# Patient Record
Sex: MALE | Employment: FULL TIME | URBAN - METROPOLITAN AREA
[De-identification: names, ages, dates, MRNs, and addresses within clinical notes are randomized per-mention and may not be internally consistent; named-entity substitution may affect disease eponyms.]

---

## 2021-07-02 ENCOUNTER — INITIAL CONSULT (OUTPATIENT)
Dept: SURGERY | Age: 56
End: 2021-07-02
Payer: COMMERCIAL

## 2021-07-02 ENCOUNTER — TELEPHONE (OUTPATIENT)
Dept: SURGERY | Age: 56
End: 2021-07-02

## 2021-07-02 VITALS
HEART RATE: 62 BPM | TEMPERATURE: 97.9 F | HEIGHT: 69 IN | DIASTOLIC BLOOD PRESSURE: 74 MMHG | SYSTOLIC BLOOD PRESSURE: 127 MMHG | WEIGHT: 175 LBS | BODY MASS INDEX: 25.92 KG/M2

## 2021-07-02 DIAGNOSIS — K21.9 GASTROESOPHAGEAL REFLUX DISEASE, UNSPECIFIED WHETHER ESOPHAGITIS PRESENT: Primary | ICD-10-CM

## 2021-07-02 PROCEDURE — 99244 OFF/OP CNSLTJ NEW/EST MOD 40: CPT | Performed by: SURGERY

## 2021-07-02 RX ORDER — LANSOPRAZOLE 30 MG/1
30 CAPSULE, DELAYED RELEASE ORAL DAILY
Qty: 90 CAPSULE | Refills: 0 | Status: SHIPPED | OUTPATIENT
Start: 2021-07-02

## 2021-07-02 RX ORDER — SODIUM CHLORIDE, SODIUM LACTATE, POTASSIUM CHLORIDE, CALCIUM CHLORIDE 600; 310; 30; 20 MG/100ML; MG/100ML; MG/100ML; MG/100ML
INJECTION, SOLUTION INTRAVENOUS CONTINUOUS
Status: CANCELLED | OUTPATIENT
Start: 2021-07-02

## 2021-07-02 RX ORDER — LANSOPRAZOLE 30 MG/1
30 CAPSULE, DELAYED RELEASE ORAL DAILY
COMMUNITY
End: 2021-07-02 | Stop reason: SDUPTHER

## 2021-07-02 NOTE — TELEPHONE ENCOUNTER
Prior Authorization Form:      DEMOGRAPHICS:                     Patient Name:  Yaritza Martinez  Patient :  1965            Insurance:  Payor: Olman Luigi [employer] 150 / Plan: AlinaDebbie Oreilly 150 - [place] PPO / Product Type: *No Product type* /   Insurance ID Number:    Payor/Plan Subscr  Sex Relation Sub. Ins. ID Effective Group Num   1.  [address] [facility]  1965 Male Self [id number]                                    [id number]         DIAGNOSIS & PROCEDURE:                       Procedure/Operation: EGD possible biopsy           CPT Code: 33362    Diagnosis:  GERD     ICD10 Code: K21.9    Location:  [facility]    Surgeon:  Cedric Payne INFORMATION:                          Date: 21    Time: TBD              Anesthesia:  MAC/TIVA                                                       Status:  Outpatient        Special Comments:         Electronically signed by Sunita Reyez RN on 2021 at 12:53 PM

## 2021-07-02 NOTE — PROGRESS NOTES
Eunice Gonzalez  7/2/2021      [facility] Office Consult    CHIEF COMPLAINT:  Acid reflux    HISTORY OF PRESENT ILLNESS:  Eunice Gonzalez is a 61 y.o.  male with acid reflux up into the throat, causes a sore throat and hoarceness. He has been on Prevacid for 20 years and was told he had a hiatal hernia. Past Medical History: He has no past medical history on file. Past Surgical History: He has no past surgical history on file. Home Medications  Prior to Visit Medications    Medication Sig Taking? Authorizing Provider   lansoprazole (PREVACID) 30 MG delayed release capsule Take 30 mg by mouth daily Yes Historical Provider, MD       Allergies: Penicillins   Social History:   TOBACCO:   reports that he has never smoked. He has never used smokeless tobacco.  All smokers should join the free smoking cessation program and stop smoking before having surgery. ETOH:    reports current alcohol use. History reviewed. No pertinent family history. Review of Systems:  Psychiatric: denies depression and anxiety  Respiratory: negative  Cardiovascular: negative  Gastrointestinal: negative  Musculoskeletal:negative  All others reviewed, negative    Physical Exam:   VITALS: Blood pressure 127/74, pulse 62, temperature 97.9 °F (36.6 °C), temperature source Temporal, height 5' 9\" (1.753 m), weight 175 lb (79.4 kg).   General appearance: alert, appears stated age and cooperative, does ambulate easily  Head: Normocephalic, without obvious abnormality, atraumatic  Eyes: PERRL  Ears/mouth/throat:  Ears clear, mouth normal, throat no redness  Neck: no adenopathy, no JVD, supple, symmetrical, trachea midline and thyroid not enlarged  Lungs: clear to auscultation bilaterally  Heart: regular rate and rhythm  Abdomen: soft, non-tender; bowel sounds normal; no masses,  no organomegaly  Extremities: extremities normal, atraumatic, no cyanosis or edema  Skin: no open wounds    ASSESSMENT: acid reflux    PLAN: EGD    Signed: Dr. Bishop Mejía Jaime Alberto M.D. Send copy of consult to PCP, No primary care provider on file.

## 2021-07-02 NOTE — TELEPHONE ENCOUNTER
Per Dr. Jonathan Curtis, patient is scheduled for EGD possible biopsy at [address] [place] [address] on 7/21/21. Surgery scheduling form faxed with confirmation, surgeon's calendar updated. Dr. Jonathan Curtis to enter orders. Follow up appointment scheduled.    Electronically signed by Lin Edouard RN on 7/2/2021 at 12:53 PM

## 2021-07-06 ENCOUNTER — TELEPHONE (OUTPATIENT)
Dept: SURGERY | Age: 56
End: 2021-07-06

## 2021-07-06 NOTE — TELEPHONE ENCOUNTER
Received a PA request for Lansoprazole 30 mg from [facility]. The drug is not not  his formulary, If he wishes he can pay 20.00 for the script or new script need to be called in. Called and left message for patient to call us back.

## 2021-07-15 ENCOUNTER — HOSPITAL ENCOUNTER (OUTPATIENT)
Age: 56
Discharge: HOME | End: 2021-07-17
Payer: COMMERCIAL

## 2021-07-15 DIAGNOSIS — Z01.818 PREOP TESTING: ICD-10-CM

## 2021-07-15 PROCEDURE — U0003 INFECTIOUS AGENT DETECTION BY NUCLEIC ACID (DNA OR RNA); SEVERE ACUTE RESPIRATORY SYNDROME CORONAVIRUS 2 (SARS-COV-2) (CORONAVIRUS DISEASE [COVID-19]), AMPLIFIED PROBE TECHNIQUE, MAKING USE OF HIGH THROUGHPUT TECHNOLOGIES AS DESCRIBED BY CMS-2020-01-R: HCPCS

## 2021-07-15 PROCEDURE — U0005 INFEC AGEN DETEC AMPLI PROBE: HCPCS

## 2021-07-16 LAB — SARS-COV-2, PCR: NOT DETECTED

## 2021-07-20 RX ORDER — CALCIUM CARBONATE 200(500)MG
1 TABLET,CHEWABLE ORAL PRN
COMMUNITY

## 2021-07-20 NOTE — PROGRESS NOTES
[address] [facility]                                                                                                                    PRE OP INSTRUCTIONS FOR  Soren Perry        Date: 7/20/2021    Date of surgery: 7/21/21   Arrival Time: Hospital will call you between 5pm and 7pm with your final arrival time for surgery    1. Do not eat or drink anything after midnight prior to surgery. This includes no water, chewing gum, mints or ice chips. 2. Take the following medications with a small sip of water on the morning of Surgery: none     3. Diabetics may take evening dose of insulin but none after midnight. If you feel symptomatic or low blood sugar morning of surgery drink 1-2 ounces of apple juice only. 4. Aspirin, Ibuprofen, Advil, Naproxen, Vitamin E and other Anti-inflammatory products should be stopped  before surgery  as directed by your physician. Take Tylenol only unless instructed otherwise by your surgeon. 5. Check with your Doctor regarding stopping Plavix, Coumadin, Lovenox, Eliquis, Effient, or other blood thinners. 6. Do not smoke,use illicit drugs and do not drink any alcoholic beverages 24 hours prior to surgery. 7. You may brush your teeth the morning of surgery. DO NOT SWALLOW WATER    8. You MUST make arrangements for a responsible adult to take you home after your surgery. You will not be allowed to leave alone or drive yourself home. It is strongly suggested someone stay with you the first 24 hrs. Your surgery will be cancelled if you do not have a ride home. 9. PEDIATRIC PATIENTS ONLY:  A parent/legal guardian must accompany a child scheduled for surgery and plan to stay at the hospital until the child is discharged. Please do not bring other children with you.     10. Please wear simple, loose fitting clothing to the hospital.  Devota Plants not bring valuables (money, credit cards, checkbooks, etc.) Do not wear any makeup (including no eye makeup) or nail polish on your fingers or toes. 11. DO NOT wear any jewelry or piercings on day of surgery. All body piercing jewelry must be removed. 12. Shower the night before surgery with _x__Antibacterial soap /DERRICK WIPES________    13. TOTAL JOINT REPLACEMENT/HYSTERECTOMY PATIENTS ONLY---Remember to bring Blood Bank bracelet to the hospital on the day of surgery. 14. If you have a Living Will and Durable Power of  for Healthcare, please bring in a copy. 15. If appropriate bring crutches, inspirex, WALKER, CANE etc... 12. Notify your Surgeon if you develop any illness between now and surgery time, cough, cold, fever, sore throat, nausea, vomiting, etc.  Please notify your surgeon if you experience dizziness, shortness of breath or blurred vision between now & the time of your surgery. 17. If you have ___dentures, they will be removed before going to the OR; we will provide you a container. If you wear ___contact lenses or ___glasses, they will be removed; please bring a case for them. 18. To provide excellent care visitors will be limited to 1 in the room at any given time. 19. Please bring picture ID and insurance card. 20. Sleep apnea patients need to bring CPAP AND SETTINGS to hospital on day of surgery. 21. During flu season no children under the age of 15 are permitted in the hospital for the safety of all patients. 22. Other please check in at information desk/wear a mask. Please call AMBULATORY CARE if you have any further questions.    [address] [facility]     [address] [place]

## 2021-07-21 ENCOUNTER — ANESTHESIA (OUTPATIENT)
Dept: ENDOSCOPY | Age: 56
End: 2021-07-21
Payer: COMMERCIAL

## 2021-07-21 ENCOUNTER — ANESTHESIA EVENT (OUTPATIENT)
Dept: ENDOSCOPY | Age: 56
End: 2021-07-21
Payer: COMMERCIAL

## 2021-07-21 ENCOUNTER — HOSPITAL ENCOUNTER (OUTPATIENT)
Age: 56
Setting detail: OUTPATIENT SURGERY
Discharge: HOME | End: 2021-07-21
Attending: SURGERY | Admitting: SURGERY
Payer: COMMERCIAL

## 2021-07-21 VITALS
SYSTOLIC BLOOD PRESSURE: 150 MMHG | OXYGEN SATURATION: 99 % | TEMPERATURE: 97.5 F | BODY MASS INDEX: 24.24 KG/M2 | HEART RATE: 62 BPM | HEIGHT: 69 IN | DIASTOLIC BLOOD PRESSURE: 78 MMHG | WEIGHT: 163.7 LBS | RESPIRATION RATE: 16 BRPM

## 2021-07-21 VITALS
OXYGEN SATURATION: 99 % | DIASTOLIC BLOOD PRESSURE: 82 MMHG | SYSTOLIC BLOOD PRESSURE: 125 MMHG | RESPIRATION RATE: 18 BRPM

## 2021-07-21 DIAGNOSIS — Z01.818 PREOP TESTING: Primary | ICD-10-CM

## 2021-07-21 PROBLEM — K21.00 GASTROESOPHAGEAL REFLUX DISEASE WITH ESOPHAGITIS WITHOUT HEMORRHAGE: Status: ACTIVE | Noted: 2021-07-21

## 2021-07-21 PROCEDURE — 7100000011 HC PHASE II RECOVERY - ADDTL 15 MIN: Performed by: SURGERY

## 2021-07-21 PROCEDURE — 7100000010 HC PHASE II RECOVERY - FIRST 15 MIN: Performed by: SURGERY

## 2021-07-21 PROCEDURE — 3609017100 HC EGD: Performed by: SURGERY

## 2021-07-21 PROCEDURE — 88305 TISSUE EXAM BY PATHOLOGIST: CPT

## 2021-07-21 PROCEDURE — 43239 EGD BIOPSY SINGLE/MULTIPLE: CPT | Performed by: SURGERY

## 2021-07-21 PROCEDURE — 87081 CULTURE SCREEN ONLY: CPT

## 2021-07-21 PROCEDURE — 2500000003 HC RX 250 WO HCPCS

## 2021-07-21 PROCEDURE — 6360000002 HC RX W HCPCS

## 2021-07-21 PROCEDURE — 2580000003 HC RX 258: Performed by: SURGERY

## 2021-07-21 PROCEDURE — 3700000000 HC ANESTHESIA ATTENDED CARE: Performed by: SURGERY

## 2021-07-21 PROCEDURE — 2709999900 HC NON-CHARGEABLE SUPPLY: Performed by: SURGERY

## 2021-07-21 RX ORDER — SODIUM CHLORIDE, SODIUM LACTATE, POTASSIUM CHLORIDE, CALCIUM CHLORIDE 600; 310; 30; 20 MG/100ML; MG/100ML; MG/100ML; MG/100ML
INJECTION, SOLUTION INTRAVENOUS CONTINUOUS
Status: DISCONTINUED | OUTPATIENT
Start: 2021-07-21 | End: 2021-07-21 | Stop reason: HOSPADM

## 2021-07-21 RX ORDER — LIDOCAINE HYDROCHLORIDE 20 MG/ML
INJECTION, SOLUTION INFILTRATION; PERINEURAL PRN
Status: DISCONTINUED | OUTPATIENT
Start: 2021-07-21 | End: 2021-07-21 | Stop reason: SDUPTHER

## 2021-07-21 RX ORDER — PROPOFOL 10 MG/ML
INJECTION, EMULSION INTRAVENOUS PRN
Status: DISCONTINUED | OUTPATIENT
Start: 2021-07-21 | End: 2021-07-21 | Stop reason: SDUPTHER

## 2021-07-21 RX ADMIN — SODIUM CHLORIDE, POTASSIUM CHLORIDE, SODIUM LACTATE AND CALCIUM CHLORIDE: 600; 310; 30; 20 INJECTION, SOLUTION INTRAVENOUS at 12:43

## 2021-07-21 RX ADMIN — PROPOFOL 120 MG: 10 INJECTION, EMULSION INTRAVENOUS at 13:27

## 2021-07-21 RX ADMIN — LIDOCAINE HYDROCHLORIDE 50 MG: 20 INJECTION, SOLUTION INFILTRATION; PERINEURAL at 13:27

## 2021-07-21 ASSESSMENT — PAIN - FUNCTIONAL ASSESSMENT: PAIN_FUNCTIONAL_ASSESSMENT: 0-10

## 2021-07-21 ASSESSMENT — PAIN SCALES - GENERAL: PAINLEVEL_OUTOF10: 0

## 2021-07-21 NOTE — PROGRESS NOTES
7/21/21 1230 dr lloyd aware pt had 4 ounces of water this am at 0830.  Per dr Rivers Borders \"this is fine\" kmo rn

## 2021-07-21 NOTE — ANESTHESIA PRE PROCEDURE
Department of Anesthesiology  Preprocedure Note       Name:  Lashonda Mejia   Age:  61 y.o.  :  1965                                          MRN:  [id number]         Date:  2021      Surgeon: Alisha Dolan):  Kandace Rios MD    Procedure: Procedure(s):  EGD ESOPHAGOGASTRODUODENOSCOPY POSSIBLE BIOPSY    Medications prior to admission:   Prior to Admission medications    Medication Sig Start Date End Date Taking? Authorizing Provider   calcium carbonate (TUMS) 500 MG chewable tablet Take 1 tablet by mouth as needed for Heartburn   Yes Historical Provider, MD   lansoprazole (PREVACID) 30 MG delayed release capsule Take 1 capsule by mouth daily 21  Yes Kandace Rios MD       Current medications:    Current Facility-Administered Medications   Medication Dose Route Frequency Provider Last Rate Last Admin    lactated ringers infusion   Intravenous Continuous Kandace Rios  mL/hr at 21 1243 New Bag at 21 1243       Allergies: Allergies   Allergen Reactions    Penicillins Rash       Problem List:  There is no problem list on file for this patient. Past Medical History:  History reviewed. No pertinent past medical history. Past Surgical History:        Procedure Laterality Date    COLONOSCOPY      ENDOSCOPY, COLON, DIAGNOSTIC      EYE SURGERY      as a child       Social History:    Social History     Tobacco Use    Smoking status: Never Smoker    Smokeless tobacco: Never Used   Substance Use Topics    Alcohol use:  Yes     Alcohol/week: 1.0 standard drinks     Types: 1 Cans of beer per week     Comment: weekly                                Counseling given: Not Answered      Vital Signs (Current):   Vitals:    21 1209   BP: 121/71   Pulse: 71   Temp: 97.7 °F (36.5 °C)   TempSrc: Infrared   SpO2: 98%   Weight: 163 lb 11.2 oz (74.3 kg)   Height: 5' 9\" (1.753 m)                                              BP Readings from Last 3 Encounters:   21 121/71 patient. Plan discussed with CRNA.                   Adrien Barros MD   7/21/2021

## 2021-07-21 NOTE — OP NOTE
[facility]    Operative Report    DATE OF PROCEDURE: 7/21/2021    SURGEON: MD CLAUDETTE Gr: none    PREOPERATIVE DIAGNOSES:    Gastroesophageal reflux disease K21.00     POSTOPERATIVE DIAGNOSES:     EGD done 7/21/2021 showed no gastritis, no duodenitis, mild linear esophagitis that may be Rudd's, no hiatal hernia. He complains of acid reflux and had been on Omeprazole to control the symptoms for many years. OPERATION:    EGD    ANESTHESIA: LMAC. BLOOD LOSS: none  SPECIMEN: esophagus erosion, gastric mucosa for RENNY    CONSENT AND INDICATIONS:  This is a 61y.o. year old male who needs to have an EGD as part of the work up. I have discussed with the patient the indication, alternatives, and the possible risks and/or complications of the planned procedure and the anesthesia methods. The patient and/or patient representative appear to understand and agree to proceed. Monitoring and Safety: Anaesthesia monitored vital signs with BP cuff, pulse oximetry and cardiac monitor and level of consciousness were continuously evaluated throughout the procedure until recovery from the medications was complete and the patient had returned to baseline status. OPERATIONS: The patient was placed on the table and sedated by anaesthesia. Bite block was placed. A lubricated scope was easily passed into the upper esophagus which looked normal. The distal esophagus had a 15 mm linear erosion that had the appearance of Rudd's, biopsy was taken. The scope was passed into the stomach and retroflexed. There was no hiatal hernia. The scope was passed down toward the pylorus. The antral mucosa was normal. Biopsy was taken to check for H. pylori. The scope was passed through the pylorus into the duodenal bulb which was normal, then around to the distal duodenum which looked normal, and the scope was withdrawn.  The patient tolerated the procedure well.    Plan:   Fundoplication may improve the reflux and decrease the chance of developing Rudd's esophagus.     Physician Signature: Electronically signed by Dr. Praveena Marshall M.D.

## 2021-07-21 NOTE — ANESTHESIA POSTPROCEDURE EVALUATION
Department of Anesthesiology  Postprocedure Note    Patient: Mane Jackman  MRN: [id number]  YOB: 1965  Date of evaluation: 7/21/2021  Time:  2:14 PM     Procedure Summary     Date: 07/21/21 Room / Location: [address] [place] [address] / [facility]    Anesthesia Start: 5242 Anesthesia Stop: 3752    Procedure: EGD ESOPHAGOGASTRODUODENOSCOPY POSSIBLE BIOPSY (N/A ) Diagnosis: (GERD)    Surgeons: Marcianne Favre, MD Responsible Provider: Munira Kimble MD    Anesthesia Type: MAC ASA Status: 2          Anesthesia Type: MAC    Aubrie Phase I: Aubrie Score: 10    Aubrie Phase II: Aubrie Score: 10    Last vitals: Reviewed and per EMR flowsheets.        Anesthesia Post Evaluation    Patient location during evaluation: PACU  Patient participation: complete - patient participated  Level of consciousness: awake  Pain score: 3  Airway patency: patent  Nausea & Vomiting: no nausea  Complications: no  Cardiovascular status: blood pressure returned to baseline  Respiratory status: acceptable  Hydration status: euvolemic

## 2021-07-21 NOTE — H&P
Soren Perry  7/21/2021      H&P    CHIEF COMPLAINT:  Acid reflux    HISTORY OF PRESENT ILLNESS:  Soren Perry is a 61 y.o.  male with acid reflux up into the throat, causes a sore throat and hoarceness. He has been on Prevacid for 20 years and was told he had a hiatal hernia. Past Medical History: He has no past medical history on file. Past Surgical History: He has a past surgical history that includes Endoscopy, colon, diagnostic; Colonoscopy; and eye surgery. Home Medications  Prior to Visit Medications    Medication Sig Taking? Authorizing Provider   calcium carbonate (TUMS) 500 MG chewable tablet Take 1 tablet by mouth as needed for Heartburn Yes Historical Provider, MD   lansoprazole (PREVACID) 30 MG delayed release capsule Take 1 capsule by mouth daily Yes Ossie Rinne, MD       Allergies: Penicillins   Social History:   TOBACCO:   reports that he has never smoked. He has never used smokeless tobacco.  All smokers should join the free smoking cessation program and stop smoking before having surgery. ETOH:    reports current alcohol use of about 1.0 standard drinks of alcohol per week. History reviewed. No pertinent family history. Review of Systems:  Psychiatric: denies depression and anxiety  Respiratory: negative  Cardiovascular: negative  Gastrointestinal: negative  Musculoskeletal:negative  All others reviewed, negative    Physical Exam:   VITALS: Blood pressure 121/71, pulse 71, temperature 97.7 °F (36.5 °C), temperature source Infrared, height 5' 9\" (1.753 m), weight 163 lb 11.2 oz (74.3 kg), SpO2 98 %.   General appearance: alert, appears stated age and cooperative, does ambulate easily  Head: Normocephalic, without obvious abnormality, atraumatic  Eyes: PERRL  Ears/mouth/throat:  Ears clear, mouth normal, throat no redness  Neck: no adenopathy, no JVD, supple, symmetrical, trachea midline and thyroid not enlarged  Lungs: clear to auscultation bilaterally  Heart: regular rate and rhythm  Abdomen: soft, non-tender; bowel sounds normal; no masses,  no organomegaly  Extremities: extremities normal, atraumatic, no cyanosis or edema  Skin: no open wounds    ASSESSMENT: acid reflux    PLAN: EGD    Signed: Dr. Praveena Marshall M.D.     Send copy of consult to PCP, Shoshana Chung MD

## 2021-07-23 LAB — CLOTEST: NORMAL

## 2021-07-30 ENCOUNTER — VIRTUAL VISIT (OUTPATIENT)
Dept: SURGERY | Age: 56
End: 2021-07-30
Payer: COMMERCIAL

## 2021-07-30 ENCOUNTER — TELEPHONE (OUTPATIENT)
Dept: SURGERY | Age: 56
End: 2021-07-30

## 2021-07-30 DIAGNOSIS — K21.00 GASTROESOPHAGEAL REFLUX DISEASE WITH ESOPHAGITIS WITHOUT HEMORRHAGE: Primary | ICD-10-CM

## 2021-07-30 PROCEDURE — 99442 PR PHYS/QHP TELEPHONE EVALUATION 11-20 MIN: CPT | Performed by: SURGERY

## 2021-07-30 RX ORDER — ACETAMINOPHEN 325 MG/1
1000 TABLET ORAL ONCE
Status: CANCELLED | OUTPATIENT
Start: 2021-07-30 | End: 2021-07-30

## 2021-07-30 RX ORDER — SODIUM CHLORIDE 9 MG/ML
INJECTION, SOLUTION INTRAVENOUS CONTINUOUS
Status: CANCELLED | OUTPATIENT
Start: 2021-07-30

## 2021-07-30 RX ORDER — KETOROLAC TROMETHAMINE 30 MG/ML
30 INJECTION, SOLUTION INTRAMUSCULAR; INTRAVENOUS ONCE
Status: CANCELLED | OUTPATIENT
Start: 2021-07-30 | End: 2021-07-30

## 2021-07-30 RX ORDER — SCOLOPAMINE TRANSDERMAL SYSTEM 1 MG/1
1 PATCH, EXTENDED RELEASE TRANSDERMAL
Status: CANCELLED | OUTPATIENT
Start: 2021-07-30 | End: 2021-08-02

## 2021-07-30 NOTE — PROGRESS NOTES
Yessenia Garcia  7/30/2021      [facility] Office phone visit    Consent:  The patient is aware that he may receive a bill for this service, depending on insurance coverage, and has provided verbal consent to proceed: Yes    Pt at home, I'm in the office, no one helped. I affirm this is a Patient Initiated Episode with an Established Patient who has not had a related appointment within my department in the past 7 days or scheduled within the next 24 hours. Patient identification was verified at the start of the visit: Yes    Total Time: minutes: 15 minutes          Yessenia Garcia is a 61 y.o.  male post EGD done 7/21/2021 showed no gastritis, RENNY test negative for H.pylori, no duodenitis, positive linear esophagitis, negative for Rudd's, intestinal metaplasia and dysplasia, was told he had a hiatal hernia. He complains of acid reflux and had been on Omeprazole to control the symptoms for 20 years. Past Medical History: He has no past medical history on file. Past Surgical History: He has a past surgical history that includes Endoscopy, colon, diagnostic; Colonoscopy; eye surgery; and Upper gastrointestinal endoscopy (N/A, 7/21/2021). Home Medications  Prior to Visit Medications    Medication Sig Taking? Authorizing Provider   calcium carbonate (TUMS) 500 MG chewable tablet Take 1 tablet by mouth as needed for Heartburn Yes Historical Provider, MD   lansoprazole (PREVACID) 30 MG delayed release capsule Take 1 capsule by mouth daily Yes Phuong Mosquera MD       Allergies: Latex and Penicillins   Social History:   TOBACCO:   reports that he has never smoked. He has never used smokeless tobacco.  All smokers should join the free smoking cessation program and stop smoking before having surgery. ETOH:    reports current alcohol use of about 1.0 standard drinks of alcohol per week. No family history on file.     Review of Systems:  Psychiatric: denies depression and anxiety  Respiratory: negative  Cardiovascular: negative  Gastrointestinal: negative  Musculoskeletal:negative  All others reviewed, negative    Physical Exam:   VITALS: There were no vitals taken for this visit. General appearance: alert, appears stated age and cooperative, does ambulate easily  Head: Normocephalic, without obvious abnormality, atraumatic  Eyes: PERRL  Ears/mouth/throat:  Ears clear, mouth normal, throat no redness  Neck: no adenopathy, no JVD, supple, symmetrical, trachea midline and thyroid not enlarged  Lungs: clear to auscultation bilaterally  Heart: regular rate and rhythm  Abdomen: soft, non-tender; bowel sounds normal; no masses,  no organomegaly  Extremities: extremities normal, atraumatic, no cyanosis or edema  Skin: no open wounds    ASSESSMENT: acid reflux    PLAN:   Robotic hiatal hernia repair/fundoplication may improve the reflux and decrease the chance of developing Rudd's esophagus. Signed: Dr. Fariha Vee M.D.     Send copy of consult to PCP, Clement Jasso MD

## 2021-08-07 ENCOUNTER — HOSPITAL ENCOUNTER (EMERGENCY)
Age: 56
Discharge: HOME | End: 2021-08-07
Attending: EMERGENCY MEDICINE
Payer: COMMERCIAL

## 2021-08-07 VITALS
RESPIRATION RATE: 16 BRPM | HEART RATE: 83 BPM | OXYGEN SATURATION: 96 % | TEMPERATURE: 97.5 F | DIASTOLIC BLOOD PRESSURE: 81 MMHG | SYSTOLIC BLOOD PRESSURE: 129 MMHG

## 2021-08-07 DIAGNOSIS — K08.89 PAIN, DENTAL: Primary | ICD-10-CM

## 2021-08-07 PROCEDURE — 6370000000 HC RX 637 (ALT 250 FOR IP): Performed by: EMERGENCY MEDICINE

## 2021-08-07 PROCEDURE — 99283 EMERGENCY DEPT VISIT LOW MDM: CPT

## 2021-08-07 RX ORDER — CLINDAMYCIN HYDROCHLORIDE 150 MG/1
450 CAPSULE ORAL 3 TIMES DAILY
Qty: 90 CAPSULE | Refills: 0 | Status: SHIPPED | OUTPATIENT
Start: 2021-08-07 | End: 2021-08-17

## 2021-08-07 RX ORDER — CLINDAMYCIN HYDROCHLORIDE 150 MG/1
450 CAPSULE ORAL ONCE
Status: COMPLETED | OUTPATIENT
Start: 2021-08-07 | End: 2021-08-07

## 2021-08-07 RX ORDER — IBUPROFEN 800 MG/1
800 TABLET ORAL EVERY 8 HOURS PRN
Qty: 21 TABLET | Refills: 0 | Status: SHIPPED | OUTPATIENT
Start: 2021-08-07 | End: 2021-08-14

## 2021-08-07 RX ADMIN — CLINDAMYCIN HYDROCHLORIDE 450 MG: 150 CAPSULE ORAL at 18:47

## 2021-08-07 NOTE — ED PROVIDER NOTES
Department of Emergency Medicine   ED  Provider Note  Admit Date/RoomTime: 8/7/2021  5:47 PM  ED Room: 12/12 8/7/21  6:42 PM EDT      HPI: Brittanie Gonsalez 61 y.o. male with a past medical history of No past medical history on file. presents with a complaint of dental pain. The patient states this pain has been gradual in onset, intermittent, moderate in severity. Currently resolved. Reports he is concerned he is getting an abscess and was worried about spread of infection to presented for antibiotics. patient denies any unilateral facial swelling. Patient is able to handle their own secretions and drink fluids without difficulty. Patient denies any fever. The patient also denies any history of dental trauma. Denies difficulty breathing or swallowing. The location of the pain appears to be isolated over tooth number 22. Review of Systems:   Pertinent positives and negatives are stated within HPI, all other systems reviewed and are negative.           --------------------------------------------- PAST HISTORY ---------------------------------------------  Past Medical History:  has no past medical history on file. Past Surgical History:  has a past surgical history that includes Endoscopy, colon, diagnostic; Colonoscopy; eye surgery; and Upper gastrointestinal endoscopy (N/A, 7/21/2021). Social History:  reports that he has never smoked. He has never used smokeless tobacco. He reports current alcohol use of about 1.0 standard drinks of alcohol per week. He reports that he does not use drugs. Family History: family history is not on file. The patients home medications have been reviewed.     Allergies: Latex and Penicillins        Nursing Notes reviewed and vital signs reviewed by myself  /81   Pulse 83   Temp 97.5 °F (36.4 °C) (Infrared)   Resp 16   SpO2 96%     Physical exam:  Constitutional: The patient is comfortable, alert and oriented x3, well appearing, non toxic in

## 2021-10-21 ENCOUNTER — TELEPHONE (OUTPATIENT)
Dept: SURGERY | Age: 56
End: 2021-10-21

## 2021-10-29 ENCOUNTER — TELEPHONE (OUTPATIENT)
Dept: SURGERY | Age: 56
End: 2021-10-29

## 2021-10-29 NOTE — TELEPHONE ENCOUNTER
Pt called stating that he had scope done and it did show that he does not have a hiatal hernia. He states that he does not need to follow up with General Surgery.

## 2023-03-29 LAB
ALANINE AMINOTRANSFERASE (SGPT) (U/L) IN SER/PLAS: 29 U/L (ref 10–52)
ALBUMIN (G/DL) IN SER/PLAS: 4.3 G/DL (ref 3.4–5)
ALKALINE PHOSPHATASE (U/L) IN SER/PLAS: 55 U/L (ref 33–120)
ANION GAP IN SER/PLAS: 12 MMOL/L (ref 10–20)
ASPARTATE AMINOTRANSFERASE (SGOT) (U/L) IN SER/PLAS: 24 U/L (ref 9–39)
BILIRUBIN TOTAL (MG/DL) IN SER/PLAS: 1.5 MG/DL (ref 0–1.2)
CALCIUM (MG/DL) IN SER/PLAS: 9.1 MG/DL (ref 8.6–10.3)
CARBON DIOXIDE, TOTAL (MMOL/L) IN SER/PLAS: 26 MMOL/L (ref 21–32)
CHLORIDE (MMOL/L) IN SER/PLAS: 104 MMOL/L (ref 98–107)
CHOLESTEROL (MG/DL) IN SER/PLAS: 154 MG/DL (ref 0–199)
CHOLESTEROL IN HDL (MG/DL) IN SER/PLAS: 61.8 MG/DL
CHOLESTEROL/HDL RATIO: 2.5
CREATININE (MG/DL) IN SER/PLAS: 0.96 MG/DL (ref 0.5–1.3)
ESTIMATED AVERAGE GLUCOSE FOR HBA1C: 120 MG/DL
FERRITIN (UG/LL) IN SER/PLAS: 440 UG/L (ref 20–300)
GFR MALE: >90 ML/MIN/1.73M2
GLUCOSE (MG/DL) IN SER/PLAS: 102 MG/DL (ref 74–99)
HEMOGLOBIN A1C/HEMOGLOBIN TOTAL IN BLOOD: 5.8 %
IRON (UG/DL) IN SER/PLAS: 102 UG/DL (ref 35–150)
IRON BINDING CAPACITY (UG/DL) IN SER/PLAS: 298 UG/DL (ref 240–445)
IRON SATURATION (%) IN SER/PLAS: 34 % (ref 25–45)
LDL: 78 MG/DL (ref 0–99)
POTASSIUM (MMOL/L) IN SER/PLAS: 4 MMOL/L (ref 3.5–5.3)
PROTEIN TOTAL: 7 G/DL (ref 6.4–8.2)
SODIUM (MMOL/L) IN SER/PLAS: 138 MMOL/L (ref 136–145)
TRIGLYCERIDE (MG/DL) IN SER/PLAS: 70 MG/DL (ref 0–149)
UREA NITROGEN (MG/DL) IN SER/PLAS: 14 MG/DL (ref 6–23)
VLDL: 14 MG/DL (ref 0–40)

## 2023-04-04 PROBLEM — R43.8 OTHER DISTURBANCES OF SMELL AND TASTE: Status: ACTIVE | Noted: 2020-02-12

## 2023-04-04 PROBLEM — R53.83 MALAISE AND FATIGUE: Status: ACTIVE | Noted: 2020-02-12

## 2023-04-04 PROBLEM — J02.9 SORE THROAT: Status: ACTIVE | Noted: 2020-02-12

## 2023-04-04 PROBLEM — R53.81 MALAISE AND FATIGUE: Status: ACTIVE | Noted: 2020-02-12

## 2023-04-04 RX ORDER — ATORVASTATIN CALCIUM 20 MG/1
1 TABLET, FILM COATED ORAL DAILY
COMMUNITY
Start: 2021-11-18 | End: 2023-04-05 | Stop reason: SDUPTHER

## 2023-04-04 RX ORDER — OMEPRAZOLE 20 MG/1
1 CAPSULE, DELAYED RELEASE ORAL DAILY PRN
COMMUNITY
Start: 2022-12-06 | End: 2023-04-05

## 2023-04-05 ENCOUNTER — OFFICE VISIT (OUTPATIENT)
Dept: PRIMARY CARE | Facility: CLINIC | Age: 58
End: 2023-04-05
Payer: COMMERCIAL

## 2023-04-05 VITALS
OXYGEN SATURATION: 97 % | HEIGHT: 70 IN | TEMPERATURE: 97.2 F | BODY MASS INDEX: 24.34 KG/M2 | DIASTOLIC BLOOD PRESSURE: 72 MMHG | SYSTOLIC BLOOD PRESSURE: 120 MMHG | WEIGHT: 170 LBS | HEART RATE: 64 BPM

## 2023-04-05 DIAGNOSIS — M25.552 BILATERAL HIP PAIN: ICD-10-CM

## 2023-04-05 DIAGNOSIS — M25.551 BILATERAL HIP PAIN: ICD-10-CM

## 2023-04-05 DIAGNOSIS — Z12.5 SCREENING PSA (PROSTATE SPECIFIC ANTIGEN): ICD-10-CM

## 2023-04-05 DIAGNOSIS — Z11.59 ENCOUNTER FOR HEPATITIS C SCREENING TEST FOR LOW RISK PATIENT: ICD-10-CM

## 2023-04-05 DIAGNOSIS — R79.89 ELEVATED FERRITIN LEVEL: ICD-10-CM

## 2023-04-05 DIAGNOSIS — R17 ELEVATED BILIRUBIN: ICD-10-CM

## 2023-04-05 DIAGNOSIS — Z11.4 SCREENING FOR HIV (HUMAN IMMUNODEFICIENCY VIRUS): ICD-10-CM

## 2023-04-05 DIAGNOSIS — K21.9 GASTROESOPHAGEAL REFLUX DISEASE WITHOUT ESOPHAGITIS: Primary | ICD-10-CM

## 2023-04-05 DIAGNOSIS — E78.5 HYPERLIPIDEMIA, UNSPECIFIED HYPERLIPIDEMIA TYPE: ICD-10-CM

## 2023-04-05 DIAGNOSIS — R73.03 PREDIABETES: ICD-10-CM

## 2023-04-05 PROCEDURE — 99214 OFFICE O/P EST MOD 30 MIN: CPT | Performed by: FAMILY MEDICINE

## 2023-04-05 PROCEDURE — 1036F TOBACCO NON-USER: CPT | Performed by: FAMILY MEDICINE

## 2023-04-05 RX ORDER — SILDENAFIL 50 MG/1
TABLET, FILM COATED ORAL
COMMUNITY
Start: 2022-09-08 | End: 2023-04-05 | Stop reason: ALTCHOICE

## 2023-04-05 RX ORDER — CALCIUM CARBONATE 200(500)MG
1 TABLET,CHEWABLE ORAL
COMMUNITY

## 2023-04-05 RX ORDER — LANSOPRAZOLE 30 MG/1
30 CAPSULE, DELAYED RELEASE ORAL DAILY
Qty: 90 CAPSULE | Refills: 3 | Status: SHIPPED | OUTPATIENT
Start: 2023-04-05 | End: 2023-10-04 | Stop reason: SDUPTHER

## 2023-04-05 RX ORDER — ATORVASTATIN CALCIUM 20 MG/1
20 TABLET, FILM COATED ORAL DAILY
Qty: 90 TABLET | Refills: 1 | Status: SHIPPED | OUTPATIENT
Start: 2023-04-05 | End: 2023-10-04 | Stop reason: SDUPTHER

## 2023-04-05 RX ORDER — LANSOPRAZOLE 30 MG/1
30 CAPSULE, DELAYED RELEASE ORAL DAILY
COMMUNITY
End: 2023-04-05 | Stop reason: SDUPTHER

## 2023-04-05 NOTE — PROGRESS NOTES
"Subjective   Patient ID: Liam Briggs is a 58 y.o. male who presents for GERD (Review labs).    Liam is here for follow-up of chronic conditions.  He has been taking his cholesterol medication as prescribed, and is not experiencing any side effects.    He tried taking omeprazole, but did not feel that it was effective.  He states that the lansoprazole works much better for him he did find a independent pharmacy that would provide him the medication for $5 per month.  He is requesting a prescription be sent there.    He has been having pain in both of his hips.  Located in the groin area.  Typically hurts the worst at night.  Has cracking in his hips when he stretches them in the mornings.         Review of Systems   Constitutional:  Negative for chills and fever.   Respiratory:  Negative for cough and shortness of breath.    Cardiovascular:  Negative for chest pain.   Gastrointestinal:  Negative for abdominal pain, diarrhea, nausea and vomiting.   Genitourinary:  Negative for dysuria.       Objective   /72   Pulse 64   Temp 36.2 °C (97.2 °F)   Ht 1.778 m (5' 10\")   Wt 77.1 kg (170 lb)   SpO2 97%   BMI 24.39 kg/m²     Physical Exam  Constitutional:       General: He is not in acute distress.     Appearance: Normal appearance.   HENT:      Head: Normocephalic.      Mouth/Throat:      Mouth: Mucous membranes are moist.   Eyes:      Extraocular Movements: Extraocular movements intact.      Conjunctiva/sclera: Conjunctivae normal.   Cardiovascular:      Rate and Rhythm: Normal rate and regular rhythm.      Heart sounds: No murmur heard.  Pulmonary:      Breath sounds: No wheezing or rhonchi.   Musculoskeletal:      Cervical back: Neck supple.   Skin:     General: Skin is warm and dry.   Neurological:      Mental Status: He is alert.   Psychiatric:         Mood and Affect: Mood normal.         Behavior: Behavior normal.         Assessment/Plan   Problem List Items Addressed This Visit          Digestive    " Gastroesophageal reflux disease - Primary (Chronic)    Relevant Medications    lansoprazole (Prevacid) 30 mg DR capsule       Musculoskeletal    Bilateral hip pain     Possible osteoarthritis.  Check bilateral hip x-ray.  Also consider tendinitis versus hernia, though none palpable on exam.         Relevant Orders    XR hips bilateral 2 VW w or wo pelvis       Endocrine/Metabolic    Prediabetes (Chronic)     Hemoglobin A1c stable at 5.8%.  Continue lifestyle changes.         Relevant Orders    CBC and Auto Differential    Hemoglobin A1C       Other    Hyperlipidemia (Chronic)    Relevant Medications    atorvastatin (Lipitor) 20 mg tablet    Other Relevant Orders    CBC and Auto Differential    Lipid Panel    Elevated bilirubin     Mildly elevated bilirubin.  Patient is not currently experiencing any abdominal pain.  Recheck bilirubin with next labs, will add on indirect bilirubin, LDH and haptoglobin.         Relevant Orders    CBC and Auto Differential    Comprehensive Metabolic Panel    Bilirubin, direct    Lactate dehydrogenase    Haptoglobin    Elevated ferritin level     Moderately elevated ferritin with normal iron saturation.  LFTs are normal.  This is possibly linked to alcohol use versus obesity.  Repeat labs ordered.          Other Visit Diagnoses       Screening for HIV (human immunodeficiency virus)        Relevant Orders    HIV 1/2 Antigen/Antibody Screen with Reflex to Confirmation    Encounter for hepatitis C screening test for low risk patient        Relevant Orders    Hepatitis C antibody    Screening PSA (prostate specific antigen)        Relevant Orders    PSA

## 2023-04-06 PROBLEM — J02.9 SORE THROAT: Status: RESOLVED | Noted: 2020-02-12 | Resolved: 2023-04-06

## 2023-04-06 PROBLEM — E78.5 HYPERLIPIDEMIA: Chronic | Status: ACTIVE | Noted: 2023-04-06

## 2023-04-06 PROBLEM — M25.551 BILATERAL HIP PAIN: Status: ACTIVE | Noted: 2023-04-06

## 2023-04-06 PROBLEM — R53.81 MALAISE AND FATIGUE: Status: RESOLVED | Noted: 2020-02-12 | Resolved: 2023-04-06

## 2023-04-06 PROBLEM — R17 ELEVATED BILIRUBIN: Status: ACTIVE | Noted: 2023-04-06

## 2023-04-06 PROBLEM — E78.5 HYPERLIPIDEMIA: Status: ACTIVE | Noted: 2023-04-06

## 2023-04-06 PROBLEM — M25.552 BILATERAL HIP PAIN: Status: ACTIVE | Noted: 2023-04-06

## 2023-04-06 PROBLEM — R53.83 MALAISE AND FATIGUE: Status: RESOLVED | Noted: 2020-02-12 | Resolved: 2023-04-06

## 2023-04-06 PROBLEM — R73.03 PREDIABETES: Chronic | Status: ACTIVE | Noted: 2023-04-06

## 2023-04-06 PROBLEM — R79.89 ELEVATED FERRITIN LEVEL: Status: ACTIVE | Noted: 2023-04-06

## 2023-04-06 PROBLEM — R73.03 PREDIABETES: Status: ACTIVE | Noted: 2023-04-06

## 2023-04-06 PROBLEM — R43.8 OTHER DISTURBANCES OF SMELL AND TASTE: Status: RESOLVED | Noted: 2020-02-12 | Resolved: 2023-04-06

## 2023-04-06 ASSESSMENT — ENCOUNTER SYMPTOMS
VOMITING: 0
ABDOMINAL PAIN: 0
NAUSEA: 0
SHORTNESS OF BREATH: 0
DIARRHEA: 0
DYSURIA: 0
COUGH: 0
CHILLS: 0
FEVER: 0

## 2023-04-06 NOTE — ASSESSMENT & PLAN NOTE
Moderately elevated ferritin with normal iron saturation.  LFTs are normal.  This is possibly linked to alcohol use versus obesity.  Repeat labs ordered.

## 2023-04-06 NOTE — ASSESSMENT & PLAN NOTE
Possible osteoarthritis.  Check bilateral hip x-ray.  Also consider tendinitis versus hernia, though none palpable on exam.

## 2023-04-06 NOTE — ASSESSMENT & PLAN NOTE
Mildly elevated bilirubin.  Patient is not currently experiencing any abdominal pain.  Recheck bilirubin with next labs, will add on indirect bilirubin, LDH and haptoglobin.

## 2023-06-12 RX ORDER — OMEPRAZOLE 20 MG/1
CAPSULE, DELAYED RELEASE ORAL
Qty: 90 CAPSULE | Refills: 1 | OUTPATIENT
Start: 2023-06-12

## 2023-09-12 LAB — PROSTATE SPECIFIC ANTIGEN,SCREEN: 0.37 NG/ML (ref 0–4)

## 2023-09-27 ENCOUNTER — LAB (OUTPATIENT)
Dept: LAB | Facility: LAB | Age: 58
End: 2023-09-27
Payer: COMMERCIAL

## 2023-09-27 DIAGNOSIS — R17 ELEVATED BILIRUBIN: ICD-10-CM

## 2023-09-27 DIAGNOSIS — E78.5 HYPERLIPIDEMIA, UNSPECIFIED HYPERLIPIDEMIA TYPE: ICD-10-CM

## 2023-09-27 DIAGNOSIS — Z11.59 ENCOUNTER FOR HEPATITIS C SCREENING TEST FOR LOW RISK PATIENT: ICD-10-CM

## 2023-09-27 DIAGNOSIS — Z12.5 SCREENING PSA (PROSTATE SPECIFIC ANTIGEN): ICD-10-CM

## 2023-09-27 DIAGNOSIS — R73.03 PREDIABETES: ICD-10-CM

## 2023-09-27 DIAGNOSIS — Z11.4 SCREENING FOR HIV (HUMAN IMMUNODEFICIENCY VIRUS): ICD-10-CM

## 2023-09-27 LAB
ALANINE AMINOTRANSFERASE (SGPT) (U/L) IN SER/PLAS: 30 U/L (ref 10–52)
ALBUMIN (G/DL) IN SER/PLAS: 4.5 G/DL (ref 3.4–5)
ALKALINE PHOSPHATASE (U/L) IN SER/PLAS: 57 U/L (ref 33–120)
ANION GAP IN SER/PLAS: 10 MMOL/L (ref 10–20)
ASPARTATE AMINOTRANSFERASE (SGOT) (U/L) IN SER/PLAS: 24 U/L (ref 9–39)
BASOPHILS (10*3/UL) IN BLOOD BY AUTOMATED COUNT: 0.07 X10E9/L (ref 0–0.1)
BASOPHILS/100 LEUKOCYTES IN BLOOD BY AUTOMATED COUNT: 1.2 % (ref 0–2)
BILIRUBIN DIRECT (MG/DL) IN SER/PLAS: 0.2 MG/DL (ref 0–0.3)
BILIRUBIN TOTAL (MG/DL) IN SER/PLAS: 1.3 MG/DL (ref 0–1.2)
CALCIUM (MG/DL) IN SER/PLAS: 9.3 MG/DL (ref 8.6–10.3)
CARBON DIOXIDE, TOTAL (MMOL/L) IN SER/PLAS: 31 MMOL/L (ref 21–32)
CHLORIDE (MMOL/L) IN SER/PLAS: 102 MMOL/L (ref 98–107)
CHOLESTEROL (MG/DL) IN SER/PLAS: 164 MG/DL (ref 0–199)
CHOLESTEROL IN HDL (MG/DL) IN SER/PLAS: 62.1 MG/DL
CHOLESTEROL/HDL RATIO: 2.6
CREATININE (MG/DL) IN SER/PLAS: 1.02 MG/DL (ref 0.5–1.3)
EOSINOPHILS (10*3/UL) IN BLOOD BY AUTOMATED COUNT: 0.2 X10E9/L (ref 0–0.7)
EOSINOPHILS/100 LEUKOCYTES IN BLOOD BY AUTOMATED COUNT: 3.4 % (ref 0–6)
ERYTHROCYTE DISTRIBUTION WIDTH (RATIO) BY AUTOMATED COUNT: 11.9 % (ref 11.5–14.5)
ERYTHROCYTE MEAN CORPUSCULAR HEMOGLOBIN CONCENTRATION (G/DL) BY AUTOMATED: 32.6 G/DL (ref 32–36)
ERYTHROCYTE MEAN CORPUSCULAR VOLUME (FL) BY AUTOMATED COUNT: 91 FL (ref 80–100)
ERYTHROCYTES (10*6/UL) IN BLOOD BY AUTOMATED COUNT: 5.13 X10E12/L (ref 4.5–5.9)
ESTIMATED AVERAGE GLUCOSE FOR HBA1C: 134 MG/DL
GFR MALE: 84 ML/MIN/1.73M2
GLUCOSE (MG/DL) IN SER/PLAS: 98 MG/DL (ref 74–99)
HAPTOGLOBIN (MG/DL) IN SER/PLAS: 107 MG/DL (ref 30–200)
HEMATOCRIT (%) IN BLOOD BY AUTOMATED COUNT: 46.9 % (ref 41–52)
HEMOGLOBIN (G/DL) IN BLOOD: 15.3 G/DL (ref 13.5–17.5)
HEMOGLOBIN A1C/HEMOGLOBIN TOTAL IN BLOOD: 6.3 %
HEPATITIS C VIRUS AB PRESENCE IN SERUM: NONREACTIVE
HIV 1/ 2 AG/AB SCREEN: NONREACTIVE
IMMATURE GRANULOCYTES/100 LEUKOCYTES IN BLOOD BY AUTOMATED COUNT: 0 % (ref 0–0.9)
LACTATE DEHYDROGENASE (U/L) IN SER/PLAS BY LAC->PYR RXN: 163 U/L (ref 84–246)
LDL: 78 MG/DL (ref 0–99)
LEUKOCYTES (10*3/UL) IN BLOOD BY AUTOMATED COUNT: 5.9 X10E9/L (ref 4.4–11.3)
LYMPHOCYTES (10*3/UL) IN BLOOD BY AUTOMATED COUNT: 1.86 X10E9/L (ref 1.2–4.8)
LYMPHOCYTES/100 LEUKOCYTES IN BLOOD BY AUTOMATED COUNT: 31.7 % (ref 13–44)
MONOCYTES (10*3/UL) IN BLOOD BY AUTOMATED COUNT: 0.7 X10E9/L (ref 0.1–1)
MONOCYTES/100 LEUKOCYTES IN BLOOD BY AUTOMATED COUNT: 11.9 % (ref 2–10)
NEUTROPHILS (10*3/UL) IN BLOOD BY AUTOMATED COUNT: 3.04 X10E9/L (ref 1.2–7.7)
NEUTROPHILS/100 LEUKOCYTES IN BLOOD BY AUTOMATED COUNT: 51.8 % (ref 40–80)
PLATELETS (10*3/UL) IN BLOOD AUTOMATED COUNT: 239 X10E9/L (ref 150–450)
POTASSIUM (MMOL/L) IN SER/PLAS: 4.7 MMOL/L (ref 3.5–5.3)
PROSTATE SPECIFIC AG (NG/ML) IN SER/PLAS: 0.33 NG/ML (ref 0–4)
PROTEIN TOTAL: 6.9 G/DL (ref 6.4–8.2)
SODIUM (MMOL/L) IN SER/PLAS: 138 MMOL/L (ref 136–145)
TRIGLYCERIDE (MG/DL) IN SER/PLAS: 118 MG/DL (ref 0–149)
UREA NITROGEN (MG/DL) IN SER/PLAS: 12 MG/DL (ref 6–23)
VLDL: 24 MG/DL (ref 0–40)

## 2023-09-27 PROCEDURE — 82248 BILIRUBIN DIRECT: CPT

## 2023-09-27 PROCEDURE — 85025 COMPLETE CBC W/AUTO DIFF WBC: CPT

## 2023-09-27 PROCEDURE — 87389 HIV-1 AG W/HIV-1&-2 AB AG IA: CPT

## 2023-09-27 PROCEDURE — 80053 COMPREHEN METABOLIC PANEL: CPT

## 2023-09-27 PROCEDURE — 83036 HEMOGLOBIN GLYCOSYLATED A1C: CPT

## 2023-09-27 PROCEDURE — 86803 HEPATITIS C AB TEST: CPT

## 2023-09-27 PROCEDURE — 83615 LACTATE (LD) (LDH) ENZYME: CPT

## 2023-09-27 PROCEDURE — 84153 ASSAY OF PSA TOTAL: CPT

## 2023-09-27 PROCEDURE — 80061 LIPID PANEL: CPT

## 2023-09-27 PROCEDURE — 83010 ASSAY OF HAPTOGLOBIN QUANT: CPT

## 2023-09-27 PROCEDURE — 36415 COLL VENOUS BLD VENIPUNCTURE: CPT

## 2023-10-04 ENCOUNTER — OFFICE VISIT (OUTPATIENT)
Dept: PRIMARY CARE | Facility: CLINIC | Age: 58
End: 2023-10-04
Payer: COMMERCIAL

## 2023-10-04 VITALS
TEMPERATURE: 97.3 F | HEART RATE: 65 BPM | SYSTOLIC BLOOD PRESSURE: 126 MMHG | WEIGHT: 165 LBS | DIASTOLIC BLOOD PRESSURE: 88 MMHG | BODY MASS INDEX: 23.68 KG/M2 | OXYGEN SATURATION: 98 %

## 2023-10-04 DIAGNOSIS — R73.03 PREDIABETES: Chronic | ICD-10-CM

## 2023-10-04 DIAGNOSIS — Z00.00 WELLNESS EXAMINATION: Primary | ICD-10-CM

## 2023-10-04 DIAGNOSIS — M25.522 BILATERAL ELBOW JOINT PAIN: ICD-10-CM

## 2023-10-04 DIAGNOSIS — E78.5 HYPERLIPIDEMIA, UNSPECIFIED HYPERLIPIDEMIA TYPE: ICD-10-CM

## 2023-10-04 DIAGNOSIS — K21.9 GASTROESOPHAGEAL REFLUX DISEASE WITHOUT ESOPHAGITIS: ICD-10-CM

## 2023-10-04 DIAGNOSIS — R79.89 ELEVATED FERRITIN LEVEL: ICD-10-CM

## 2023-10-04 DIAGNOSIS — M25.521 BILATERAL ELBOW JOINT PAIN: ICD-10-CM

## 2023-10-04 PROBLEM — K21.00 GASTROESOPHAGEAL REFLUX DISEASE WITH ESOPHAGITIS WITHOUT HEMORRHAGE: Status: ACTIVE | Noted: 2021-07-21

## 2023-10-04 PROBLEM — E80.4 GILBERT SYNDROME: Chronic | Status: ACTIVE | Noted: 2023-04-06

## 2023-10-04 PROBLEM — N52.9 ERECTILE DYSFUNCTION OF ORGANIC ORIGIN: Status: ACTIVE | Noted: 2023-10-04

## 2023-10-04 PROBLEM — N40.1 BENIGN PROSTATIC HYPERPLASIA WITH LOWER URINARY TRACT SYMPTOMS: Status: ACTIVE | Noted: 2023-10-04

## 2023-10-04 PROCEDURE — 99214 OFFICE O/P EST MOD 30 MIN: CPT | Performed by: FAMILY MEDICINE

## 2023-10-04 PROCEDURE — 99396 PREV VISIT EST AGE 40-64: CPT | Performed by: FAMILY MEDICINE

## 2023-10-04 PROCEDURE — 1036F TOBACCO NON-USER: CPT | Performed by: FAMILY MEDICINE

## 2023-10-04 RX ORDER — LANSOPRAZOLE 30 MG/1
30 CAPSULE, DELAYED RELEASE ORAL DAILY
Qty: 90 CAPSULE | Refills: 3 | Status: SHIPPED | OUTPATIENT
Start: 2023-10-04 | End: 2024-04-03 | Stop reason: SDUPTHER

## 2023-10-04 RX ORDER — ATORVASTATIN CALCIUM 20 MG/1
20 TABLET, FILM COATED ORAL DAILY
Qty: 90 TABLET | Refills: 1 | Status: SHIPPED | OUTPATIENT
Start: 2023-10-04 | End: 2024-04-03 | Stop reason: SDUPTHER

## 2023-10-04 RX ORDER — TAMSULOSIN HYDROCHLORIDE 0.4 MG/1
0.4 CAPSULE ORAL NIGHTLY
COMMUNITY
Start: 2023-09-13 | End: 2023-12-14

## 2023-10-04 ASSESSMENT — ENCOUNTER SYMPTOMS
CHILLS: 0
FEVER: 0
SORE THROAT: 0
RHINORRHEA: 0
PALPITATIONS: 0
VOMITING: 0
ABDOMINAL PAIN: 0
APPETITE CHANGE: 0
SHORTNESS OF BREATH: 0
NAUSEA: 0
DYSURIA: 0
CONSTIPATION: 0
FATIGUE: 0
COUGH: 0
DIARRHEA: 0

## 2023-10-04 NOTE — PROGRESS NOTES
Subjective   Patient ID: Liam Briggs is a 58 y.o. male who presents for GERD, Hyperlipidemia, and Prediabetes (Review BW).    Liam presents for annual physical and follow-up of chronic conditions.    Overall he has been feeling well, but he is having issues with both elbows.  He is unable to straighten them completely.  He feels that they lock up frequently.  Is having pain on the inside of both elbows.  No injury.  He is unsure how long his symptoms have been going on.    Does drink 3-4 beers on a regular basis.  Labs done.  Here to review.         Review of Systems   Constitutional:  Negative for appetite change, chills, fatigue and fever.   HENT:  Negative for congestion, rhinorrhea and sore throat.    Eyes:  Negative for visual disturbance.   Respiratory:  Negative for cough and shortness of breath.    Cardiovascular:  Negative for chest pain and palpitations.   Gastrointestinal:  Negative for abdominal pain, constipation, diarrhea, nausea and vomiting.   Genitourinary:  Negative for dysuria.       Objective   /88   Pulse 65   Temp 36.3 °C (97.3 °F)   Wt 74.8 kg (165 lb)   SpO2 98%   BMI 23.68 kg/m²     Physical Exam  Constitutional:       General: He is not in acute distress.     Appearance: Normal appearance.   HENT:      Head: Normocephalic.      Nose: No congestion.      Mouth/Throat:      Mouth: Mucous membranes are moist.   Eyes:      Extraocular Movements: Extraocular movements intact.      Conjunctiva/sclera: Conjunctivae normal.   Cardiovascular:      Rate and Rhythm: Normal rate and regular rhythm.      Heart sounds: No murmur heard.  Pulmonary:      Effort: Pulmonary effort is normal.      Breath sounds: No wheezing or rhonchi.   Abdominal:      Palpations: Abdomen is soft.      Tenderness: There is no abdominal tenderness.   Musculoskeletal:         General: No swelling or tenderness.   Skin:     General: Skin is warm and dry.   Neurological:      General: No focal deficit present.       Mental Status: He is alert.   Psychiatric:         Mood and Affect: Mood normal.         Behavior: Behavior normal.         Assessment/Plan   Problem List Items Addressed This Visit             ICD-10-CM    Gastroesophageal reflux disease (Chronic) K21.9     Continue lansoprazole.         Relevant Medications    lansoprazole (Prevacid) 30 mg DR capsule    Hyperlipidemia (Chronic) E78.5    Relevant Medications    atorvastatin (Lipitor) 20 mg tablet    Prediabetes (Chronic) R73.03     Hemoglobin A1c increased to 6.3%.  Recommend reduce dairy and meat in the diet to improve.         Relevant Orders    Comprehensive Metabolic Panel    Hemoglobin A1C    Elevated ferritin level R79.89     Likely linked to alcohol use.  Continue to monitor.         Relevant Orders    Ferritin    Iron and TIBC    CBC and Auto Differential    Bilateral elbow joint pain M25.521, M25.522     X-ray ordered.  Consider orthopedic consult.         Relevant Orders    XR elbow 3+ views bilateral     Other Visit Diagnoses         Codes    Wellness examination    -  Primary Z00.00    Recommend Shingrix and Tdap vaccines.

## 2024-01-10 DIAGNOSIS — N40.1 BPH WITH OBSTRUCTION/LOWER URINARY TRACT SYMPTOMS: ICD-10-CM

## 2024-01-10 DIAGNOSIS — N13.8 BPH WITH OBSTRUCTION/LOWER URINARY TRACT SYMPTOMS: ICD-10-CM

## 2024-01-10 RX ORDER — TAMSULOSIN HYDROCHLORIDE 0.4 MG/1
0.4 CAPSULE ORAL NIGHTLY
Qty: 30 CAPSULE | Refills: 0 | Status: SHIPPED | OUTPATIENT
Start: 2024-01-10

## 2024-03-27 ENCOUNTER — LAB (OUTPATIENT)
Dept: LAB | Facility: LAB | Age: 59
End: 2024-03-27
Payer: COMMERCIAL

## 2024-03-27 DIAGNOSIS — R73.03 PREDIABETES: Chronic | ICD-10-CM

## 2024-03-27 DIAGNOSIS — R79.89 ELEVATED FERRITIN LEVEL: ICD-10-CM

## 2024-03-27 LAB
ALBUMIN SERPL BCP-MCNC: 4.4 G/DL (ref 3.4–5)
ALP SERPL-CCNC: 63 U/L (ref 33–136)
ALT SERPL W P-5'-P-CCNC: 30 U/L (ref 10–52)
ANION GAP SERPL CALC-SCNC: 11 MMOL/L (ref 10–20)
AST SERPL W P-5'-P-CCNC: 37 U/L (ref 9–39)
BASOPHILS # BLD AUTO: 0.09 X10*3/UL (ref 0–0.1)
BASOPHILS NFR BLD AUTO: 1.4 %
BILIRUB SERPL-MCNC: 1.3 MG/DL (ref 0–1.2)
BUN SERPL-MCNC: 17 MG/DL (ref 6–23)
CALCIUM SERPL-MCNC: 9.1 MG/DL (ref 8.6–10.3)
CHLORIDE SERPL-SCNC: 103 MMOL/L (ref 98–107)
CO2 SERPL-SCNC: 29 MMOL/L (ref 21–32)
CREAT SERPL-MCNC: 0.91 MG/DL (ref 0.5–1.3)
EGFRCR SERPLBLD CKD-EPI 2021: >90 ML/MIN/1.73M*2
EOSINOPHIL # BLD AUTO: 0.17 X10*3/UL (ref 0–0.7)
EOSINOPHIL NFR BLD AUTO: 2.7 %
ERYTHROCYTE [DISTWIDTH] IN BLOOD BY AUTOMATED COUNT: 12.3 % (ref 11.5–14.5)
EST. AVERAGE GLUCOSE BLD GHB EST-MCNC: 140 MG/DL
FERRITIN SERPL-MCNC: 511 NG/ML (ref 20–300)
GLUCOSE SERPL-MCNC: 102 MG/DL (ref 74–99)
HBA1C MFR BLD: 6.5 %
HCT VFR BLD AUTO: 45.7 % (ref 41–52)
HGB BLD-MCNC: 14.4 G/DL (ref 13.5–17.5)
IMM GRANULOCYTES # BLD AUTO: 0.01 X10*3/UL (ref 0–0.7)
IMM GRANULOCYTES NFR BLD AUTO: 0.2 % (ref 0–0.9)
IRON SATN MFR SERPL: 40 % (ref 25–45)
IRON SERPL-MCNC: 116 UG/DL (ref 35–150)
LYMPHOCYTES # BLD AUTO: 2.16 X10*3/UL (ref 1.2–4.8)
LYMPHOCYTES NFR BLD AUTO: 33.7 %
MCH RBC QN AUTO: 29 PG (ref 26–34)
MCHC RBC AUTO-ENTMCNC: 31.5 G/DL (ref 32–36)
MCV RBC AUTO: 92 FL (ref 80–100)
MONOCYTES # BLD AUTO: 0.73 X10*3/UL (ref 0.1–1)
MONOCYTES NFR BLD AUTO: 11.4 %
NEUTROPHILS # BLD AUTO: 3.25 X10*3/UL (ref 1.2–7.7)
NEUTROPHILS NFR BLD AUTO: 50.6 %
NRBC BLD-RTO: 0 /100 WBCS (ref 0–0)
PLATELET # BLD AUTO: 298 X10*3/UL (ref 150–450)
POTASSIUM SERPL-SCNC: 4.5 MMOL/L (ref 3.5–5.3)
PROT SERPL-MCNC: 6.9 G/DL (ref 6.4–8.2)
RBC # BLD AUTO: 4.97 X10*6/UL (ref 4.5–5.9)
SODIUM SERPL-SCNC: 138 MMOL/L (ref 136–145)
TIBC SERPL-MCNC: 290 UG/DL (ref 240–445)
UIBC SERPL-MCNC: 174 UG/DL (ref 110–370)
WBC # BLD AUTO: 6.4 X10*3/UL (ref 4.4–11.3)

## 2024-03-27 PROCEDURE — 80053 COMPREHEN METABOLIC PANEL: CPT

## 2024-03-27 PROCEDURE — 83036 HEMOGLOBIN GLYCOSYLATED A1C: CPT

## 2024-03-27 PROCEDURE — 36415 COLL VENOUS BLD VENIPUNCTURE: CPT

## 2024-03-27 PROCEDURE — 83540 ASSAY OF IRON: CPT

## 2024-03-27 PROCEDURE — 82728 ASSAY OF FERRITIN: CPT

## 2024-03-27 PROCEDURE — 83550 IRON BINDING TEST: CPT

## 2024-03-27 PROCEDURE — 85025 COMPLETE CBC W/AUTO DIFF WBC: CPT

## 2024-04-03 ENCOUNTER — OFFICE VISIT (OUTPATIENT)
Dept: PRIMARY CARE | Facility: CLINIC | Age: 59
End: 2024-04-03
Payer: COMMERCIAL

## 2024-04-03 VITALS
OXYGEN SATURATION: 97 % | WEIGHT: 165 LBS | DIASTOLIC BLOOD PRESSURE: 70 MMHG | TEMPERATURE: 97.8 F | BODY MASS INDEX: 23.68 KG/M2 | HEART RATE: 68 BPM | SYSTOLIC BLOOD PRESSURE: 122 MMHG

## 2024-04-03 DIAGNOSIS — K21.9 GASTROESOPHAGEAL REFLUX DISEASE WITHOUT ESOPHAGITIS: ICD-10-CM

## 2024-04-03 DIAGNOSIS — R17 ELEVATED BILIRUBIN: ICD-10-CM

## 2024-04-03 DIAGNOSIS — R79.89 ELEVATED FERRITIN LEVEL: Primary | ICD-10-CM

## 2024-04-03 DIAGNOSIS — E11.9 TYPE 2 DIABETES MELLITUS WITHOUT COMPLICATION, WITHOUT LONG-TERM CURRENT USE OF INSULIN (MULTI): ICD-10-CM

## 2024-04-03 DIAGNOSIS — E78.5 HYPERLIPIDEMIA, UNSPECIFIED HYPERLIPIDEMIA TYPE: ICD-10-CM

## 2024-04-03 PROCEDURE — 99215 OFFICE O/P EST HI 40 MIN: CPT | Performed by: FAMILY MEDICINE

## 2024-04-03 PROCEDURE — 3044F HG A1C LEVEL LT 7.0%: CPT | Performed by: FAMILY MEDICINE

## 2024-04-03 PROCEDURE — 3078F DIAST BP <80 MM HG: CPT | Performed by: FAMILY MEDICINE

## 2024-04-03 PROCEDURE — 3074F SYST BP LT 130 MM HG: CPT | Performed by: FAMILY MEDICINE

## 2024-04-03 RX ORDER — ATORVASTATIN CALCIUM 20 MG/1
20 TABLET, FILM COATED ORAL DAILY
Qty: 90 TABLET | Refills: 1 | Status: SHIPPED | OUTPATIENT
Start: 2024-04-03

## 2024-04-03 RX ORDER — LANSOPRAZOLE 30 MG/1
30 CAPSULE, DELAYED RELEASE ORAL DAILY
Qty: 90 CAPSULE | Refills: 3 | Status: SHIPPED | OUTPATIENT
Start: 2024-04-03

## 2024-04-03 NOTE — PROGRESS NOTES
Subjective   Patient ID: Liam Briggs is a 59 y.o. male who presents for Hyperlipidemia and Prediabetes (Recheck, review labs.).    Liam has been feeling well. No new concerns. Aci reflux well-controlled with lansoprazole. No adverse effects from atorvastatin.            Review of Systems   Constitutional:  Negative for activity change, appetite change, chills, fever and unexpected weight change.   Respiratory:  Negative for cough and shortness of breath.    Cardiovascular:  Negative for chest pain.   Gastrointestinal:  Negative for abdominal pain, diarrhea, nausea and vomiting.   Genitourinary:  Negative for dysuria.       Objective   /70   Pulse 68   Temp 36.6 °C (97.8 °F)   Wt 74.8 kg (165 lb)   SpO2 97%   BMI 23.68 kg/m²     Physical Exam  Constitutional:       General: He is not in acute distress.     Appearance: Normal appearance.   HENT:      Head: Normocephalic.      Mouth/Throat:      Mouth: Mucous membranes are moist.   Eyes:      Extraocular Movements: Extraocular movements intact.      Conjunctiva/sclera: Conjunctivae normal.   Cardiovascular:      Rate and Rhythm: Normal rate and regular rhythm.      Heart sounds: No murmur heard.  Pulmonary:      Breath sounds: No wheezing or rhonchi.   Musculoskeletal:      Cervical back: Neck supple.   Skin:     General: Skin is warm and dry.   Neurological:      Mental Status: He is alert.   Psychiatric:         Mood and Affect: Mood normal.         Behavior: Behavior normal.         Assessment/Plan   Problem List Items Addressed This Visit             ICD-10-CM    Gastroesophageal reflux disease (Chronic) K21.9     Stable.         Relevant Medications    lansoprazole (Prevacid) 30 mg DR capsule    Hyperlipidemia (Chronic) E78.5     LDL at goal.          Relevant Medications    atorvastatin (Lipitor) 20 mg tablet    Other Relevant Orders    Lipid Panel    Comprehensive Metabolic Panel    Type 2 diabetes mellitus without complication, without long-term  met with patient and reviewed new medication. confirmed with pharmacy regarding coverage and transitions of care. all questions/concerns addressed. current use of insulin (CMS/HCC) E11.9     HgbA1c increased to 6.5%. Discuss lifestyle changes.          Relevant Orders    Hemoglobin A1C    Elevated ferritin level - Primary R79.89     Persistently elevated ferritin with bilirubin elevation. Patient reduced alcohol consumption to 3-4 drinks/week. Check liver US. Plan additional labs if US negative.          Relevant Orders    US abdomen limited liver     Other Visit Diagnoses         Codes    Elevated bilirubin     R17    Relevant Orders    US abdomen limited liver          Time Based Billing:  - Prep Time on Date of Patient Encounter:  4 minutes  - Time Directly with Patient/Family/Caregiver:   42 minutes  - Documentation Time:   1 minutes    Total Minutes:  45

## 2024-04-04 PROBLEM — M25.551 BILATERAL HIP PAIN: Status: RESOLVED | Noted: 2023-04-06 | Resolved: 2024-04-04

## 2024-04-04 PROBLEM — M25.521 BILATERAL ELBOW JOINT PAIN: Status: RESOLVED | Noted: 2023-10-04 | Resolved: 2024-04-04

## 2024-04-04 PROBLEM — M25.552 BILATERAL HIP PAIN: Status: RESOLVED | Noted: 2023-04-06 | Resolved: 2024-04-04

## 2024-04-04 PROBLEM — M25.522 BILATERAL ELBOW JOINT PAIN: Status: RESOLVED | Noted: 2023-10-04 | Resolved: 2024-04-04

## 2024-04-04 ASSESSMENT — ENCOUNTER SYMPTOMS
UNEXPECTED WEIGHT CHANGE: 0
FEVER: 0
COUGH: 0
DYSURIA: 0
DIARRHEA: 0
VOMITING: 0
CHILLS: 0
SHORTNESS OF BREATH: 0
ACTIVITY CHANGE: 0
APPETITE CHANGE: 0
NAUSEA: 0
ABDOMINAL PAIN: 0

## 2024-04-04 NOTE — ASSESSMENT & PLAN NOTE
Persistently elevated ferritin with bilirubin elevation. Patient reduced alcohol consumption to 3-4 drinks/week. Check liver US. Plan additional labs if US negative.

## 2024-04-10 ENCOUNTER — HOSPITAL ENCOUNTER (OUTPATIENT)
Dept: RADIOLOGY | Facility: HOSPITAL | Age: 59
Discharge: HOME | End: 2024-04-10
Payer: COMMERCIAL

## 2024-04-10 DIAGNOSIS — R17 ELEVATED BILIRUBIN: ICD-10-CM

## 2024-04-10 DIAGNOSIS — R79.89 ELEVATED FERRITIN LEVEL: ICD-10-CM

## 2024-04-10 PROCEDURE — 76705 ECHO EXAM OF ABDOMEN: CPT

## 2024-04-10 PROCEDURE — 76705 ECHO EXAM OF ABDOMEN: CPT | Performed by: RADIOLOGY

## 2024-04-19 ENCOUNTER — TELEPHONE (OUTPATIENT)
Dept: PRIMARY CARE | Facility: CLINIC | Age: 59
End: 2024-04-19
Payer: COMMERCIAL

## 2024-04-19 NOTE — TELEPHONE ENCOUNTER
Patient states he had an US abd is asking if [facility] would read it today and let him know results and next steps.

## 2024-05-26 ENCOUNTER — APPOINTMENT (OUTPATIENT)
Dept: RADIOLOGY | Facility: HOSPITAL | Age: 59
End: 2024-05-26
Payer: COMMERCIAL

## 2024-05-26 ENCOUNTER — APPOINTMENT (OUTPATIENT)
Dept: CARDIOLOGY | Facility: HOSPITAL | Age: 59
End: 2024-05-26
Payer: COMMERCIAL

## 2024-05-26 ENCOUNTER — HOSPITAL ENCOUNTER (EMERGENCY)
Facility: HOSPITAL | Age: 59
Discharge: HOME | End: 2024-05-26
Attending: EMERGENCY MEDICINE
Payer: COMMERCIAL

## 2024-05-26 VITALS
WEIGHT: 160 LBS | BODY MASS INDEX: 24.25 KG/M2 | DIASTOLIC BLOOD PRESSURE: 90 MMHG | OXYGEN SATURATION: 99 % | HEIGHT: 68 IN | RESPIRATION RATE: 15 BRPM | TEMPERATURE: 97.3 F | SYSTOLIC BLOOD PRESSURE: 116 MMHG | HEART RATE: 64 BPM

## 2024-05-26 DIAGNOSIS — R55 SYNCOPE, UNSPECIFIED SYNCOPE TYPE: Primary | ICD-10-CM

## 2024-05-26 LAB
ALBUMIN SERPL BCP-MCNC: 4.5 G/DL (ref 3.4–5)
ALP SERPL-CCNC: 53 U/L (ref 33–136)
ALT SERPL W P-5'-P-CCNC: 30 U/L (ref 10–52)
ANION GAP SERPL CALC-SCNC: 9 MMOL/L (ref 10–20)
APPEARANCE UR: CLEAR
AST SERPL W P-5'-P-CCNC: 30 U/L (ref 9–39)
BASOPHILS # BLD AUTO: 0.08 X10*3/UL (ref 0–0.1)
BASOPHILS NFR BLD AUTO: 1.2 %
BILIRUB SERPL-MCNC: 1.4 MG/DL (ref 0–1.2)
BILIRUB UR STRIP.AUTO-MCNC: NEGATIVE MG/DL
BNP SERPL-MCNC: 15 PG/ML (ref 0–99)
BUN SERPL-MCNC: 14 MG/DL (ref 6–23)
CALCIUM SERPL-MCNC: 9.2 MG/DL (ref 8.6–10.3)
CARDIAC TROPONIN I PNL SERPL HS: 3 NG/L (ref 0–20)
CARDIAC TROPONIN I PNL SERPL HS: 3 NG/L (ref 0–20)
CHLORIDE SERPL-SCNC: 107 MMOL/L (ref 98–107)
CO2 SERPL-SCNC: 26 MMOL/L (ref 21–32)
COLOR UR: ABNORMAL
CREAT SERPL-MCNC: 0.94 MG/DL (ref 0.5–1.3)
EGFRCR SERPLBLD CKD-EPI 2021: >90 ML/MIN/1.73M*2
EOSINOPHIL # BLD AUTO: 0.16 X10*3/UL (ref 0–0.7)
EOSINOPHIL NFR BLD AUTO: 2.3 %
ERYTHROCYTE [DISTWIDTH] IN BLOOD BY AUTOMATED COUNT: 12.4 % (ref 11.5–14.5)
GLUCOSE BLD MANUAL STRIP-MCNC: 99 MG/DL (ref 74–99)
GLUCOSE SERPL-MCNC: 105 MG/DL (ref 74–99)
GLUCOSE UR STRIP.AUTO-MCNC: ABNORMAL MG/DL
HCT VFR BLD AUTO: 45.3 % (ref 41–52)
HGB BLD-MCNC: 15.2 G/DL (ref 13.5–17.5)
IMM GRANULOCYTES # BLD AUTO: 0.01 X10*3/UL (ref 0–0.7)
IMM GRANULOCYTES NFR BLD AUTO: 0.1 % (ref 0–0.9)
KETONES UR STRIP.AUTO-MCNC: ABNORMAL MG/DL
LEUKOCYTE ESTERASE UR QL STRIP.AUTO: NEGATIVE
LYMPHOCYTES # BLD AUTO: 2.71 X10*3/UL (ref 1.2–4.8)
LYMPHOCYTES NFR BLD AUTO: 39 %
MCH RBC QN AUTO: 30 PG (ref 26–34)
MCHC RBC AUTO-ENTMCNC: 33.6 G/DL (ref 32–36)
MCV RBC AUTO: 90 FL (ref 80–100)
MONOCYTES # BLD AUTO: 0.98 X10*3/UL (ref 0.1–1)
MONOCYTES NFR BLD AUTO: 14.1 %
NEUTROPHILS # BLD AUTO: 3 X10*3/UL (ref 1.2–7.7)
NEUTROPHILS NFR BLD AUTO: 43.3 %
NITRITE UR QL STRIP.AUTO: NEGATIVE
NRBC BLD-RTO: 0 /100 WBCS (ref 0–0)
PH UR STRIP.AUTO: 7 [PH]
PLATELET # BLD AUTO: 272 X10*3/UL (ref 150–450)
POTASSIUM SERPL-SCNC: 3.7 MMOL/L (ref 3.5–5.3)
PROT SERPL-MCNC: 7.2 G/DL (ref 6.4–8.2)
PROT UR STRIP.AUTO-MCNC: NEGATIVE MG/DL
RBC # BLD AUTO: 5.06 X10*6/UL (ref 4.5–5.9)
RBC # UR STRIP.AUTO: NEGATIVE /UL
SODIUM SERPL-SCNC: 138 MMOL/L (ref 136–145)
SP GR UR STRIP.AUTO: 1.02
UROBILINOGEN UR STRIP.AUTO-MCNC: NORMAL MG/DL
WBC # BLD AUTO: 6.9 X10*3/UL (ref 4.4–11.3)

## 2024-05-26 PROCEDURE — 71045 X-RAY EXAM CHEST 1 VIEW: CPT

## 2024-05-26 PROCEDURE — 84484 ASSAY OF TROPONIN QUANT: CPT | Performed by: EMERGENCY MEDICINE

## 2024-05-26 PROCEDURE — 85025 COMPLETE CBC W/AUTO DIFF WBC: CPT | Performed by: EMERGENCY MEDICINE

## 2024-05-26 PROCEDURE — 96374 THER/PROPH/DIAG INJ IV PUSH: CPT

## 2024-05-26 PROCEDURE — 96361 HYDRATE IV INFUSION ADD-ON: CPT

## 2024-05-26 PROCEDURE — 2500000004 HC RX 250 GENERAL PHARMACY W/ HCPCS (ALT 636 FOR OP/ED): Performed by: EMERGENCY MEDICINE

## 2024-05-26 PROCEDURE — 99284 EMERGENCY DEPT VISIT MOD MDM: CPT | Mod: 25

## 2024-05-26 PROCEDURE — 81003 URINALYSIS AUTO W/O SCOPE: CPT | Performed by: EMERGENCY MEDICINE

## 2024-05-26 PROCEDURE — 83880 ASSAY OF NATRIURETIC PEPTIDE: CPT | Performed by: EMERGENCY MEDICINE

## 2024-05-26 PROCEDURE — 93005 ELECTROCARDIOGRAM TRACING: CPT

## 2024-05-26 PROCEDURE — 82947 ASSAY GLUCOSE BLOOD QUANT: CPT | Mod: 59

## 2024-05-26 PROCEDURE — 80053 COMPREHEN METABOLIC PANEL: CPT | Performed by: EMERGENCY MEDICINE

## 2024-05-26 PROCEDURE — 36415 COLL VENOUS BLD VENIPUNCTURE: CPT | Performed by: EMERGENCY MEDICINE

## 2024-05-26 PROCEDURE — 71045 X-RAY EXAM CHEST 1 VIEW: CPT | Performed by: RADIOLOGY

## 2024-05-26 RX ORDER — ONDANSETRON HYDROCHLORIDE 2 MG/ML
4 INJECTION, SOLUTION INTRAVENOUS ONCE
Status: COMPLETED | OUTPATIENT
Start: 2024-05-26 | End: 2024-05-26

## 2024-05-26 RX ADMIN — SODIUM CHLORIDE, POTASSIUM CHLORIDE, SODIUM LACTATE AND CALCIUM CHLORIDE 1000 ML: 600; 310; 30; 20 INJECTION, SOLUTION INTRAVENOUS at 07:31

## 2024-05-26 RX ADMIN — ONDANSETRON 4 MG: 2 INJECTION INTRAMUSCULAR; INTRAVENOUS at 07:32

## 2024-05-26 ASSESSMENT — LIFESTYLE VARIABLES
HAVE YOU EVER FELT YOU SHOULD CUT DOWN ON YOUR DRINKING: NO
EVER FELT BAD OR GUILTY ABOUT YOUR DRINKING: NO
EVER HAD A DRINK FIRST THING IN THE MORNING TO STEADY YOUR NERVES TO GET RID OF A HANGOVER: NO
TOTAL SCORE: 0
HAVE PEOPLE ANNOYED YOU BY CRITICIZING YOUR DRINKING: NO

## 2024-05-26 ASSESSMENT — COLUMBIA-SUICIDE SEVERITY RATING SCALE - C-SSRS
1. IN THE PAST MONTH, HAVE YOU WISHED YOU WERE DEAD OR WISHED YOU COULD GO TO SLEEP AND NOT WAKE UP?: NO
6. HAVE YOU EVER DONE ANYTHING, STARTED TO DO ANYTHING, OR PREPARED TO DO ANYTHING TO END YOUR LIFE?: NO
2. HAVE YOU ACTUALLY HAD ANY THOUGHTS OF KILLING YOURSELF?: NO

## 2024-05-26 ASSESSMENT — PAIN - FUNCTIONAL ASSESSMENT: PAIN_FUNCTIONAL_ASSESSMENT: 0-10

## 2024-05-26 ASSESSMENT — PAIN SCALES - GENERAL: PAINLEVEL_OUTOF10: 0 - NO PAIN

## 2024-05-26 NOTE — ED PROVIDER NOTES
HPI   Chief Complaint   Patient presents with    Syncope       HPI     HISTORY OF PRESENT ILLNESS:  Patient is a 59-year-old who arrives to the emergency department as a rapid response.  The patient was upstairs with his wife who is currently a patient.  The surgeon, Dr. Marino, started to discuss treatment options, possible ostomy reversal.  The patient became lightheaded, diaphoretic.  He was in the chair and his head started to tilt back.  Patient had syncopized with a head shaking motion for approximately 15 seconds.  Patient did not appear to have a postictal period.  Patient was found to be diaphoretic and feeling unwell.  He was nauseous had a small amount of nonbloody nonbilious emesis.  Patient is still feeling shaky at the moment but otherwise well.  Blood sugars were in the 90s.  Patient denied history of seizures.  Denies chest pain currently.  Did have prior syncope episodes in the past.  Is not seeing a cardiologist doctor.    Past Medical History: Hyperlipidemia, type II diabetic managed by lifestyle modification, acid reflux  Past Surgical History: Denied  Family History: Paternal history of CAD with heart stents in his 60s  Social History: Denied smoking or EtOH use.  Denied recreational drugs    __________________________________________________________  PHYSICAL EXAM:    Appearance:  male, appears stated age, alert, oriented , cooperative   Skin: Intact,  dry skin, no lesions, rash, petechiae or purpura.   Eyes: PERRLA, EOMs intact,  Conjunctiva pink with no redness or exudates.    HENT: Normocephalic, atraumatic. Nares patent.  No tongue bite.  Neck: Supple. Trachea at midline.   Pulmonary: Lung sounds are clear bilaterally.  There is no rales, rhonchi, or wheezing.  Cardiac: Regular rate and rhythm, no rubs, murmurs, or gallops. No JVD,   Abdomen: Abdomen is soft, nontender, and nondistended.  No palpable organomegaly.  No rebound or guarding.  No CVA tenderness. Nonsurgical  abdomen  Genitourinary: Exam deferred.  Musculoskeletal: no edema, pain, cyanosis, or deformity in extremities. Pulses full and equal.   Neurological:  Cranial nerves are grossly intact, grossly normal sensation, no weakness, no focal findings identified.    __________________________________________________________  MEDICAL DECISION MAKING:    Patient was seen and examined. Differential diagnosis for seizure-like activity includes syncope, new onset seizure, arrhythmia, vasovagal event.  Patient was a rapid response from upstairs.  He denied any chest pain.  He did feel like he was going to pass out.  The surgeon was discussing with his wife surgical options when he felt lightheaded.  This seems to be consistent with a possible vasovagal event.  Patient does also have a prior history of multiple syncopes in the past.  Patient was not witnessed to have a postictal period.  Patient currently is neurologically intact.  Denies any headache or chest pain.  Patient will undergo cardiac workup.  Will be provided IV fluids.  Zofran also ordered as needed for nausea.  Patient initially told me he was prediabetic.  However, reviewing the patient's notes with a hemoglobin A1c of 6.5, the patient is a diabetic which increases risk for ACS.    Patient orthostatics was negative.  Patient cardiac workup was overall unremarkable.  Negative troponins.  The patient chest x-ray was negative.  Vital signs remained stable.  Patient had no chest pain.  When reevaluated, patient continued to felt well.  He was able to ambulate.  The patient was informed of his workup.  His history is most suggestive of a vasovagal event.  However, he does have some risk factors and I advised him to follow-up with heart doctor.  He was provided cardiology follow-up.  He was given strict return precautions.  Patient and family members feel comfortable with this plan.  Patient was discharged from the emergency department.    Chronic Medical Conditions  Significantly Affecting Care: Hyperlipidemia, diabetic    External Records Reviewed: I reviewed recent and relevant outside records including: Primary care physician note from April 3, 2024    Sotero Finley  Emergency Medicine               [place] Coma Scale Score: 15                     Patient History   Past Medical History:   Diagnosis Date    Allergy to other foods     History of food allergy    Disease of stomach and duodenum, unspecified     Stomach problems     Past Surgical History:   Procedure Laterality Date    OTHER SURGICAL HISTORY  02/26/2020    No history of surgery     Family History   Problem Relation Name Age of Onset    Coronary artery disease Father          MI 63    Colon cancer Other Grandmother      Social History     Tobacco Use    Smoking status: Never    Smokeless tobacco: Never   Substance Use Topics    Alcohol use: Not on file    Drug use: Not on file       Physical Exam   ED Triage Vitals [05/26/24 0726]   Temperature Heart Rate Respirations BP   36.3 °C (97.3 °F) 56 16 101/68      Pulse Ox Temp Source Heart Rate Source Patient Position   95 % Tympanic Monitor --      BP Location FiO2 (%)     -- --       Physical Exam    ED Course & MDM   ED Course as of 05/26/24 1016   Sun May 26, 2024   0741 Patient twelve-lead EKG interpreted by myself shows sinus rhythm, ventricular rate of 53, normal VA interval, normal axis, normal QRS duration, normal QT, no ischemic changes [WJ]   0846 XR chest 1 view  I independently reviewed the patient's chest x-ray.  It does appear negative for obvious pneumothorax or opacity.  Radiology read is pending. [WJ]   1001 Troponin I, High Sensitivity: 3 [WJ]      ED Course User Index  [WJ] Sotero Finley DO         Diagnoses as of 05/26/24 1016   Syncope, unspecified syncope type       Medical Decision Making      Procedure  Procedures     Sotero Finley DO  05/26/24 1017

## 2024-05-26 NOTE — NURSING NOTE
Patient was visiting his wife, talking to Dr. Marino. Patient began shaking, unresponsive, pale and diaphoretic. /74, HR 40. Blood glucose 96. Patient became alert and oriented but remained pale and diaphoretic. Patient transferred to ED via cot.

## 2024-05-28 LAB
ATRIAL RATE: 53 BPM
P AXIS: 80 DEGREES
PR INTERVAL: 171 MS
Q ONSET: 249 MS
QRS COUNT: 8 BEATS
QRS DURATION: 105 MS
QT INTERVAL: 422 MS
QTC CALCULATION(BAZETT): 397 MS
QTC FREDERICIA: 405 MS
R AXIS: 57 DEGREES
T AXIS: 47 DEGREES
T OFFSET: 460 MS
VENTRICULAR RATE: 53 BPM

## 2024-06-26 RX ORDER — SILDENAFIL 50 MG/1
50 TABLET, FILM COATED ORAL
COMMUNITY
Start: 2022-09-08

## 2024-07-02 ENCOUNTER — APPOINTMENT (OUTPATIENT)
Dept: CARDIOLOGY | Facility: CLINIC | Age: 59
End: 2024-07-02
Payer: COMMERCIAL

## 2024-07-02 VITALS
WEIGHT: 159 LBS | BODY MASS INDEX: 24.18 KG/M2 | DIASTOLIC BLOOD PRESSURE: 64 MMHG | HEART RATE: 61 BPM | SYSTOLIC BLOOD PRESSURE: 120 MMHG

## 2024-07-02 DIAGNOSIS — R07.89 OTHER CHEST PAIN: ICD-10-CM

## 2024-07-02 DIAGNOSIS — R55 SYNCOPE AND COLLAPSE: ICD-10-CM

## 2024-07-02 DIAGNOSIS — E78.2 MIXED HYPERLIPIDEMIA: Primary | Chronic | ICD-10-CM

## 2024-07-02 PROCEDURE — 3078F DIAST BP <80 MM HG: CPT | Performed by: INTERNAL MEDICINE

## 2024-07-02 PROCEDURE — 99203 OFFICE O/P NEW LOW 30 MIN: CPT | Performed by: INTERNAL MEDICINE

## 2024-07-02 PROCEDURE — 3044F HG A1C LEVEL LT 7.0%: CPT | Performed by: INTERNAL MEDICINE

## 2024-07-02 PROCEDURE — 3074F SYST BP LT 130 MM HG: CPT | Performed by: INTERNAL MEDICINE

## 2024-07-02 PROCEDURE — 93000 ELECTROCARDIOGRAM COMPLETE: CPT | Performed by: INTERNAL MEDICINE

## 2024-07-02 NOTE — LETTER
July 2, 2024     Yajaira Melgoza DO  [address]  [address]  [facility] [address]    Patient: Liam Briggs   YOB: 1965   Date of Visit: 7/2/2024       Dear Dr. Yajaira Melgoza DO:    Thank you for referring Liam Briggs to me for evaluation. Below are my notes for this consultation.  If you have questions, please do not hesitate to call me. I look forward to following your patient along with you.       Sincerely,     Hany Umanzor MD      CC: No Recipients  ______________________________________________________________________________________    Counseling:  The patient was counseled regarding diagnostic results, instructions for management, risk factor reductions, prognosis, patient and family education, impressions, risks and benefits of treatment options and importance of compliance with treatment.      Chief Complaint:  The patient presents today for cardiovascular evaluation of syncope.     History Of Present Illness:    Liam Briggs is a 59 y.o. male patient whose PMH is significant for hyperlipidemia, DM type 2, GERD and BPH. He presents today to establish cardiovascular care for the evaluation and management of syncope. The patient presented to the ED on 05/26/2024 as a rapid response after having a syncopal episode. While in the ED, EKG showed NSR, CXR was negative, urinalysis showed 1+ glucose and trace ketones, and labs were unremarkable. He was discharged home the same day with outpatient cardiology followup. The patient reports a history of syncope in his childhood. He indicates that his recent syncopal episode occurred following significant psychosocial stress. The patient reports very occasional chest tightness, but otherwise denies any exertional CP or discomfort. He denies any SOB either at rest or with exertion. The patient is currently on atorvastatin for management of hyperlipidemia. The patient's family history is positive for heart disease in his father who had a heart  attack and PCI x3. The patient is a nonsmoker.       Past Surgical History:  He has a past surgical history that includes Other surgical history (02/26/2020).      Social History:  He reports that he has never smoked. He has never used smokeless tobacco. No history on file for alcohol use and drug use.    Family History:  Family History   Problem Relation Name Age of Onset   • Coronary artery disease Father          MI 63   • Colon cancer Other Grandmother         Allergies:  Penicillin, Latex, and Peanut    Outpatient Medications:  Current Outpatient Medications   Medication Instructions   • atorvastatin (LIPITOR) 20 mg, oral, Daily   • calcium carbonate (Tums) 200 mg calcium chewable tablet 1 tablet, oral   • lansoprazole (PREVACID) 30 mg, oral, Daily   • sildenafil (VIAGRA) 50 mg, oral   • tamsulosin (FLOMAX) 0.4 mg, oral, Nightly        Last Recorded Vitals:  Vitals:    07/02/24 1250   BP: 120/64   Pulse: 61   Weight: 72.1 kg (159 lb)       Review of Systems   Cardiovascular:         Occasional chest discomfort   Neurological:         Syncope   All other systems reviewed and are negative.     Physical Exam:  Constitutional:       Appearance: Healthy appearance. Not in distress.   Neck:      Vascular: No JVR. JVD normal.   Pulmonary:      Effort: Pulmonary effort is normal.      Breath sounds: Normal breath sounds. No wheezing. No rhonchi. No rales.   Chest:      Chest wall: Not tender to palpatation.   Cardiovascular:      PMI at left midclavicular line. Normal rate. Regular rhythm. Normal S1. Normal S2.       Murmurs: There is no murmur.      No gallop.  No click. No rub.   Pulses:     Intact distal pulses.   Edema:     Peripheral edema absent.   Abdominal:      General: Bowel sounds are normal.      Palpations: Abdomen is soft.      Tenderness: There is no abdominal tenderness.   Musculoskeletal: Normal range of motion.         General: No tenderness. Skin:     General: Skin is warm and dry.   Neurological:       General: No focal deficit present.      Mental Status: Alert and oriented to person, place and time.            Last Labs:  CBC -  Lab Results   Component Value Date    WBC 6.9 05/26/2024    HGB 15.2 05/26/2024    HCT 45.3 05/26/2024    MCV 90 05/26/2024     05/26/2024       CMP -  Lab Results   Component Value Date    CALCIUM 9.2 05/26/2024    PROT 7.2 05/26/2024    ALBUMIN 4.5 05/26/2024    AST 30 05/26/2024    ALT 30 05/26/2024    ALKPHOS 53 05/26/2024    BILITOT 1.4 (H) 05/26/2024       LIPID PANEL -   Lab Results   Component Value Date    CHOL 164 09/27/2023    TRIG 118 09/27/2023    HDL 62.1 09/27/2023    CHHDL 2.6 09/27/2023    LDLF 78 09/27/2023    VLDL 24 09/27/2023       RENAL FUNCTION PANEL -   Lab Results   Component Value Date    GLUCOSE 105 (H) 05/26/2024     05/26/2024    K 3.7 05/26/2024     05/26/2024    CO2 26 05/26/2024    ANIONGAP 9 (L) 05/26/2024    BUN 14 05/26/2024    CREATININE 0.94 05/26/2024    GFRMALE 84 09/27/2023    CALCIUM 9.2 05/26/2024    ALBUMIN 4.5 05/26/2024        Lab Results   Component Value Date    BNP 15 05/26/2024    HGBA1C 6.5 (H) 03/27/2024       Last Cardiology Tests:  11/10/2021 - CT Calcium Score  1. Total: 21.1.  2. The visualized mid/lower ascending thoracic aorta, is normal in size, measuring 31 mm in diameter. The heart is normal in size. No pericardial effusion is present.Main pulmonary artery is normal in caliber.  3. There is no evidence of lymphadenopathy or mass within the visualized mediastinum.The visualized esophagus is unremarkable.  4. There is a 4 mm noncalcified subpleural lateral right middle lobe lung nodule on image 28. The caudal to this is a 4 mm nodule on image 35. Caudal to that is a 4 mm nodule on image 38. There is a 6 mm  noncalcified lateral left lower lobe lung nodule on image 73.   5. There was an 8 mm cyst in segment 7 of the liver on image 99.   6. Otherwise, the visualized subdiaphragmatic structures demonstrate no  remarkable findings.    Lab review: I have personally reviewed the laboratory result(s).    Assessment/Plan  1) Syncope  ED evaluation 05/26/2024 as a rapid response after having a syncopal episode - EKG showed NSR, CXR negative, Urinalysis with 1+ glucose and trace ketones, labs unremarkable.  H/O syncope in childhood  Recent syncopal episode occurred following significant psychosocial stress  Reports occasional chest tightness  Otherwise denies exertional CP or discomfort  Denies SOB either at rest or with exertion  Currently on atorvastatin for management of hyperlipidemia  FH positive for heart disease in father who had a heart attack and PCI x3  Nonsmoker   Suspect this is vasovagal in nature   Check 7-day Holter  Check stress echo  F/U 1 month      Scribe Attestation  By signing my name below, I, Diana Vaz   attest that this documentation has been prepared under the direction and in the presence of Hany Umanzor MD.

## 2024-07-02 NOTE — PROGRESS NOTES
Counseling:  The patient was counseled regarding diagnostic results, instructions for management, risk factor reductions, prognosis, patient and family education, impressions, risks and benefits of treatment options and importance of compliance with treatment.      Chief Complaint:  The patient presents today for cardiovascular evaluation of syncope.     History Of Present Illness:    Liam Briggs is a 59 y.o. male patient whose PMH is significant for hyperlipidemia, DM type 2, GERD and BPH. He presents today to establish cardiovascular care for the evaluation and management of syncope. The patient presented to the ED on 05/26/2024 as a rapid response after having a syncopal episode. While in the ED, EKG showed NSR, CXR was negative, urinalysis showed 1+ glucose and trace ketones, and labs were unremarkable. He was discharged home the same day with outpatient cardiology followup. The patient reports a history of syncope in his childhood. He indicates that his recent syncopal episode occurred following significant psychosocial stress. The patient reports very occasional chest tightness, but otherwise denies any exertional CP or discomfort. He denies any SOB either at rest or with exertion. The patient is currently on atorvastatin for management of hyperlipidemia. The patient's family history is positive for heart disease in his father who had a heart attack and PCI x3. The patient is a nonsmoker.       Past Surgical History:  He has a past surgical history that includes Other surgical history (02/26/2020).      Social History:  He reports that he has never smoked. He has never used smokeless tobacco. No history on file for alcohol use and drug use.    Family History:  Family History   Problem Relation Name Age of Onset    Coronary artery disease Father          MI 63    Colon cancer Other Grandmother         Allergies:  Penicillin, Latex, and Peanut    Outpatient Medications:  Current Outpatient Medications    Medication Instructions    atorvastatin (LIPITOR) 20 mg, oral, Daily    calcium carbonate (Tums) 200 mg calcium chewable tablet 1 tablet, oral    lansoprazole (PREVACID) 30 mg, oral, Daily    sildenafil (VIAGRA) 50 mg, oral    tamsulosin (FLOMAX) 0.4 mg, oral, Nightly        Last Recorded Vitals:  Vitals:    07/02/24 1250   BP: 120/64   Pulse: 61   Weight: 72.1 kg (159 lb)       Review of Systems   Cardiovascular:         Occasional chest discomfort   Neurological:         Syncope   All other systems reviewed and are negative.     Physical Exam:  Constitutional:       Appearance: Healthy appearance. Not in distress.   Neck:      Vascular: No JVR. JVD normal.   Pulmonary:      Effort: Pulmonary effort is normal.      Breath sounds: Normal breath sounds. No wheezing. No rhonchi. No rales.   Chest:      Chest wall: Not tender to palpatation.   Cardiovascular:      PMI at left midclavicular line. Normal rate. Regular rhythm. Normal S1. Normal S2.       Murmurs: There is no murmur.      No gallop.  No click. No rub.   Pulses:     Intact distal pulses.   Edema:     Peripheral edema absent.   Abdominal:      General: Bowel sounds are normal.      Palpations: Abdomen is soft.      Tenderness: There is no abdominal tenderness.   Musculoskeletal: Normal range of motion.         General: No tenderness. Skin:     General: Skin is warm and dry.   Neurological:      General: No focal deficit present.      Mental Status: Alert and oriented to person, place and time.            Last Labs:  CBC -  Lab Results   Component Value Date    WBC 6.9 05/26/2024    HGB 15.2 05/26/2024    HCT 45.3 05/26/2024    MCV 90 05/26/2024     05/26/2024       CMP -  Lab Results   Component Value Date    CALCIUM 9.2 05/26/2024    PROT 7.2 05/26/2024    ALBUMIN 4.5 05/26/2024    AST 30 05/26/2024    ALT 30 05/26/2024    ALKPHOS 53 05/26/2024    BILITOT 1.4 (H) 05/26/2024       LIPID PANEL -   Lab Results   Component Value Date    CHOL 164  09/27/2023    TRIG 118 09/27/2023    HDL 62.1 09/27/2023    CHHDL 2.6 09/27/2023    LDLF 78 09/27/2023    VLDL 24 09/27/2023       RENAL FUNCTION PANEL -   Lab Results   Component Value Date    GLUCOSE 105 (H) 05/26/2024     05/26/2024    K 3.7 05/26/2024     05/26/2024    CO2 26 05/26/2024    ANIONGAP 9 (L) 05/26/2024    BUN 14 05/26/2024    CREATININE 0.94 05/26/2024    GFRMALE 84 09/27/2023    CALCIUM 9.2 05/26/2024    ALBUMIN 4.5 05/26/2024        Lab Results   Component Value Date    BNP 15 05/26/2024    HGBA1C 6.5 (H) 03/27/2024       Last Cardiology Tests:  11/10/2021 - CT Calcium Score  1. Total: 21.1.  2. The visualized mid/lower ascending thoracic aorta, is normal in size, measuring 31 mm in diameter. The heart is normal in size. No pericardial effusion is present.Main pulmonary artery is normal in caliber.  3. There is no evidence of lymphadenopathy or mass within the visualized mediastinum.The visualized esophagus is unremarkable.  4. There is a 4 mm noncalcified subpleural lateral right middle lobe lung nodule on image 28. The caudal to this is a 4 mm nodule on image 35. Caudal to that is a 4 mm nodule on image 38. There is a 6 mm  noncalcified lateral left lower lobe lung nodule on image 73.   5. There was an 8 mm cyst in segment 7 of the liver on image 99.   6. Otherwise, the visualized subdiaphragmatic structures demonstrate no remarkable findings.    Lab review: I have personally reviewed the laboratory result(s).    Assessment/Plan   1) Syncope  ED evaluation 05/26/2024 as a rapid response after having a syncopal episode - EKG showed NSR, CXR negative, Urinalysis with 1+ glucose and trace ketones, labs unremarkable.  H/O syncope in childhood  Recent syncopal episode occurred following significant psychosocial stress  Reports occasional chest tightness  Otherwise denies exertional CP or discomfort  Denies SOB either at rest or with exertion  Currently on atorvastatin for management of  hyperlipidemia  FH positive for heart disease in father who had a heart attack and PCI x3  Nonsmoker   Suspect this is vasovagal in nature   Check 7-day Holter  Check stress echo  F/U 1 month      Scribe Attestation  By signing my name below, I, Diana Vaz   attest that this documentation has been prepared under the direction and in the presence of Hany Umanzor MD.

## 2024-07-02 NOTE — PATIENT INSTRUCTIONS
Dr. Umanzor has ordered a heart monitor to assess your heart monitor.  Dr. Umanzor has also ordered a stress test to ensure your heart is getting adequate blood flow and there is no evidence of any blockages within the heart arteries.    Followup with Dr. Umanzor in 1 month.    If you have any questions or cardiac concerns, please call our office at [phone].

## 2024-07-03 ENCOUNTER — OFFICE VISIT (OUTPATIENT)
Dept: PRIMARY CARE | Facility: CLINIC | Age: 59
End: 2024-07-03
Payer: COMMERCIAL

## 2024-07-03 VITALS
DIASTOLIC BLOOD PRESSURE: 56 MMHG | SYSTOLIC BLOOD PRESSURE: 84 MMHG | OXYGEN SATURATION: 98 % | HEART RATE: 71 BPM | WEIGHT: 159 LBS | TEMPERATURE: 97.9 F | BODY MASS INDEX: 24.18 KG/M2

## 2024-07-03 DIAGNOSIS — S46.911D STRAIN OF RIGHT ELBOW, SUBSEQUENT ENCOUNTER: Primary | ICD-10-CM

## 2024-07-03 PROCEDURE — 3078F DIAST BP <80 MM HG: CPT | Performed by: FAMILY MEDICINE

## 2024-07-03 PROCEDURE — 99213 OFFICE O/P EST LOW 20 MIN: CPT | Performed by: FAMILY MEDICINE

## 2024-07-03 PROCEDURE — 3074F SYST BP LT 130 MM HG: CPT | Performed by: FAMILY MEDICINE

## 2024-07-03 PROCEDURE — 3044F HG A1C LEVEL LT 7.0%: CPT | Performed by: FAMILY MEDICINE

## 2024-07-03 NOTE — PROGRESS NOTES
"Subjective   Patient ID: Kevin Briggs \"Liam\" is a 59 y.o. male who presents for Elbow Injury (R elbow pain causing ROM x5 days due to falling at home).  HPI patient presents with elbow pain x 5 days.  He reports that he had a fall 5 days ago where he landed on his arm and elbow.  He had some swelling in the area and tenderness.  He has had some decreased range of motion.  Having difficulty brushing his teeth.  He reports that it was primarily painful yesterday and last night but this morning he has not felt very much pain.  He still is having some difficulty with his range of movement.  He reports that even prior to injury he was not able to fully extend his forearm on both arms.    Patient Active Problem List   Diagnosis    Gastroesophageal reflux disease    Hyperlipidemia    Type 2 diabetes mellitus without complication, without long-term current use of insulin (Multi)    Elevated ferritin level    Benign prostatic hyperplasia with lower urinary tract symptoms    Erectile dysfunction of organic origin       Social Connections: Not on file       Current Outpatient Medications on File Prior to Visit   Medication Sig Dispense Refill    atorvastatin (Lipitor) 20 mg tablet Take 1 tablet (20 mg) by mouth once daily. 90 tablet 1    calcium carbonate (Tums) 200 mg calcium chewable tablet Chew 1 tablet (500 mg).      lansoprazole (Prevacid) 30 mg DR capsule Take 1 capsule (30 mg) by mouth once daily. 90 capsule 3    sildenafil (Viagra) 50 mg tablet Take 1 tablet (50 mg) by mouth.      [DISCONTINUED] tamsulosin (Flomax) 0.4 mg 24 hr capsule TAKE 1 CAPSULE BY MOUTH EVERYDAY AT BEDTIME 30 capsule 0     No current facility-administered medications on file prior to visit.        Vitals:    07/03/24 1320   BP: 84/56   Pulse: 71   Temp: 36.6 °C (97.9 °F)   SpO2: 98%     Vitals:    07/03/24 1320   Weight: 72.1 kg (159 lb)       Review of Systems   All other systems reviewed and are negative.      Objective     Physical " Exam  Musculoskeletal:      Comments: Nontender to palpation at right elbow.  There are number of scabs on elbow.  No erythema.  Somewhat decreased range of motion to flexion and extension of forearm.         Admission on 05/26/2024, Discharged on 05/26/2024   Component Date Value Ref Range Status    Color, Urine 05/26/2024 Light-Yellow  Light-Yellow, Yellow, Dark-Yellow Final    Appearance, Urine 05/26/2024 Clear  Clear Final    Specific Gravity, Urine 05/26/2024 1.020  1.005 - 1.035 Final    pH, Urine 05/26/2024 7.0  5.0, 5.5, 6.0, 6.5, 7.0, 7.5, 8.0 Final    Protein, Urine 05/26/2024 NEGATIVE  NEGATIVE, 10 (TRACE), 20 (TRACE) mg/dL Final    Glucose, Urine 05/26/2024 70 (1+) (A)  Normal mg/dL Final    Blood, Urine 05/26/2024 NEGATIVE  NEGATIVE Final    Ketones, Urine 05/26/2024 TRACE (A)  NEGATIVE mg/dL Final    Bilirubin, Urine 05/26/2024 NEGATIVE  NEGATIVE Final    Urobilinogen, Urine 05/26/2024 Normal  Normal mg/dL Final    Nitrite, Urine 05/26/2024 NEGATIVE  NEGATIVE Final    Leukocyte Esterase, Urine 05/26/2024 NEGATIVE  NEGATIVE Final    WBC 05/26/2024 6.9  4.4 - 11.3 x10*3/uL Final    nRBC 05/26/2024 0.0  0.0 - 0.0 /100 WBCs Final    RBC 05/26/2024 5.06  4.50 - 5.90 x10*6/uL Final    Hemoglobin 05/26/2024 15.2  13.5 - 17.5 g/dL Final    Hematocrit 05/26/2024 45.3  41.0 - 52.0 % Final    MCV 05/26/2024 90  80 - 100 fL Final    MCH 05/26/2024 30.0  26.0 - 34.0 pg Final    MCHC 05/26/2024 33.6  32.0 - 36.0 g/dL Final    RDW 05/26/2024 12.4  11.5 - 14.5 % Final    Platelets 05/26/2024 272  150 - 450 x10*3/uL Final    Neutrophils % 05/26/2024 43.3  40.0 - 80.0 % Final    Immature Granulocytes %, Automated 05/26/2024 0.1  0.0 - 0.9 % Final    Immature Granulocyte Count (IG) includes promyelocytes, myelocytes and metamyelocytes but does not include bands. Percent differential counts (%) should be interpreted in the context of the absolute cell counts (cells/UL).    Lymphocytes % 05/26/2024 39.0  13.0 - 44.0 %  Final    Monocytes % 05/26/2024 14.1  2.0 - 10.0 % Final    Eosinophils % 05/26/2024 2.3  0.0 - 6.0 % Final    Basophils % 05/26/2024 1.2  0.0 - 2.0 % Final    Neutrophils Absolute 05/26/2024 3.00  1.20 - 7.70 x10*3/uL Final    Percent differential counts (%) should be interpreted in the context of the absolute cell counts (cells/uL).    Immature Granulocytes Absolute, Au* 05/26/2024 0.01  0.00 - 0.70 x10*3/uL Final    Lymphocytes Absolute 05/26/2024 2.71  1.20 - 4.80 x10*3/uL Final    Monocytes Absolute 05/26/2024 0.98  0.10 - 1.00 x10*3/uL Final    Eosinophils Absolute 05/26/2024 0.16  0.00 - 0.70 x10*3/uL Final    Basophils Absolute 05/26/2024 0.08  0.00 - 0.10 x10*3/uL Final    Glucose 05/26/2024 105 (H)  74 - 99 mg/dL Final    Sodium 05/26/2024 138  136 - 145 mmol/L Final    Potassium 05/26/2024 3.7  3.5 - 5.3 mmol/L Final    Chloride 05/26/2024 107  98 - 107 mmol/L Final    Bicarbonate 05/26/2024 26  21 - 32 mmol/L Final    Anion Gap 05/26/2024 9 (L)  10 - 20 mmol/L Final    Urea Nitrogen 05/26/2024 14  6 - 23 mg/dL Final    Creatinine 05/26/2024 0.94  0.50 - 1.30 mg/dL Final    eGFR 05/26/2024 >90  >60 mL/min/1.73m*2 Final    Calculations of estimated GFR are performed using the 2021 CKD-EPI Study Refit equation without the race variable for the IDMS-Traceable creatinine methods.  https://jasn.asnjournals.org/content/early/2021/09/22/ASN.2015551103    Calcium 05/26/2024 9.2  8.6 - 10.3 mg/dL Final    Albumin 05/26/2024 4.5  3.4 - 5.0 g/dL Final    Alkaline Phosphatase 05/26/2024 53  33 - 136 U/L Final    Total Protein 05/26/2024 7.2  6.4 - 8.2 g/dL Final    AST 05/26/2024 30  9 - 39 U/L Final    Bilirubin, Total 05/26/2024 1.4 (H)  0.0 - 1.2 mg/dL Final    ALT 05/26/2024 30  10 - 52 U/L Final    Patients treated with Sulfasalazine may generate falsely decreased results for ALT.    BNP 05/26/2024 15  0 - 99 pg/mL Final    Ventricular Rate 05/26/2024 53  BPM Final    Atrial Rate 05/26/2024 53  BPM Final    AZ  Interval 05/26/2024 171  ms Final    QRS Duration 05/26/2024 105  ms Final    QT Interval 05/26/2024 422  ms Final    QTC Calculation(Bazett) 05/26/2024 397  ms Final    P Axis 05/26/2024 80  degrees Final    R Axis 05/26/2024 57  degrees Final    T Axis 05/26/2024 47  degrees Final    QRS Count 05/26/2024 8  beats Final    Q Onset 05/26/2024 249  ms Final    T Offset 05/26/2024 460  ms Final    QTC Fredericia 05/26/2024 405  ms Final    Troponin I, High Sensitivity 05/26/2024 3  0 - 20 ng/L Final    POCT Glucose 05/26/2024 99  74 - 99 mg/dL Final    Troponin I, High Sensitivity 05/26/2024 3  0 - 20 ng/L Final       Assessment/Plan   Problem List Items Addressed This Visit    None  Visit Diagnoses         Codes    Strain of right elbow, subsequent encounter    -  Primary S46.911D

## 2024-07-17 ENCOUNTER — HOSPITAL ENCOUNTER (OUTPATIENT)
Dept: CARDIOLOGY | Facility: HOSPITAL | Age: 59
Discharge: HOME | End: 2024-07-17
Payer: COMMERCIAL

## 2024-07-17 DIAGNOSIS — E78.2 MIXED HYPERLIPIDEMIA: Chronic | ICD-10-CM

## 2024-07-17 DIAGNOSIS — R07.89 OTHER CHEST PAIN: ICD-10-CM

## 2024-07-17 DIAGNOSIS — E78.5 HYPERLIPIDEMIA, UNSPECIFIED: ICD-10-CM

## 2024-07-17 DIAGNOSIS — R55 SYNCOPE AND COLLAPSE: ICD-10-CM

## 2024-07-17 PROCEDURE — 93018 CV STRESS TEST I&R ONLY: CPT | Performed by: INTERNAL MEDICINE

## 2024-07-17 PROCEDURE — 93350 STRESS TTE ONLY: CPT | Performed by: INTERNAL MEDICINE

## 2024-07-17 PROCEDURE — 2500000004 HC RX 250 GENERAL PHARMACY W/ HCPCS (ALT 636 FOR OP/ED): Performed by: INTERNAL MEDICINE

## 2024-07-17 PROCEDURE — 93350 STRESS TTE ONLY: CPT

## 2024-07-17 PROCEDURE — 93016 CV STRESS TEST SUPVJ ONLY: CPT | Performed by: INTERNAL MEDICINE

## 2024-07-22 ENCOUNTER — ANCILLARY PROCEDURE (OUTPATIENT)
Dept: CARDIOLOGY | Facility: CLINIC | Age: 59
End: 2024-07-22
Payer: COMMERCIAL

## 2024-07-22 DIAGNOSIS — E78.2 MIXED HYPERLIPIDEMIA: Chronic | ICD-10-CM

## 2024-07-22 DIAGNOSIS — R07.89 OTHER CHEST PAIN: ICD-10-CM

## 2024-07-22 DIAGNOSIS — R55 SYNCOPE AND COLLAPSE: ICD-10-CM

## 2024-07-22 PROCEDURE — 93244 EXT ECG>48HR<7D REV&INTERPJ: CPT | Performed by: INTERNAL MEDICINE

## 2024-08-02 ENCOUNTER — TELEPHONE (OUTPATIENT)
Dept: CARDIOLOGY | Facility: HOSPITAL | Age: 59
End: 2024-08-02
Payer: COMMERCIAL

## 2024-08-08 ENCOUNTER — APPOINTMENT (OUTPATIENT)
Dept: CARDIOLOGY | Facility: CLINIC | Age: 59
End: 2024-08-08
Payer: COMMERCIAL

## 2024-08-09 ENCOUNTER — APPOINTMENT (OUTPATIENT)
Dept: CARDIOLOGY | Facility: HOSPITAL | Age: 59
End: 2024-08-09
Payer: COMMERCIAL

## 2024-09-12 ENCOUNTER — OFFICE VISIT (OUTPATIENT)
Dept: CARDIOLOGY | Facility: HOSPITAL | Age: 59
End: 2024-09-12
Payer: COMMERCIAL

## 2024-09-12 VITALS
DIASTOLIC BLOOD PRESSURE: 74 MMHG | HEART RATE: 67 BPM | HEIGHT: 70 IN | WEIGHT: 164 LBS | BODY MASS INDEX: 23.48 KG/M2 | OXYGEN SATURATION: 96 % | SYSTOLIC BLOOD PRESSURE: 116 MMHG

## 2024-09-12 DIAGNOSIS — E78.5 HYPERLIPIDEMIA: Primary | Chronic | ICD-10-CM

## 2024-09-12 PROCEDURE — 3008F BODY MASS INDEX DOCD: CPT | Performed by: INTERNAL MEDICINE

## 2024-09-12 PROCEDURE — 3078F DIAST BP <80 MM HG: CPT | Performed by: INTERNAL MEDICINE

## 2024-09-12 PROCEDURE — 99212 OFFICE O/P EST SF 10 MIN: CPT | Performed by: INTERNAL MEDICINE

## 2024-09-12 PROCEDURE — 3044F HG A1C LEVEL LT 7.0%: CPT | Performed by: INTERNAL MEDICINE

## 2024-09-12 PROCEDURE — 3074F SYST BP LT 130 MM HG: CPT | Performed by: INTERNAL MEDICINE

## 2024-09-12 NOTE — PATIENT INSTRUCTIONS
Continue all current medications as prescribed.  Your syncopal episode was very likely vasovagal in nature. Please be sure to keep yourself well hydrated throughout the day. Increase dietary salt intake.  Followup with Dr. Umanzor on an as needed basis.    If you have any questions or cardiac concerns, please call our office at [phone].

## 2024-09-12 NOTE — LETTER
September 12, 2024     Yajaira Melgoza DO  [address]  [address] [address]    Patient: Liam Briggs   YOB: 1965   Date of Visit: 9/12/2024       Dear Dr. Yajaira Melgoza DO:    Thank you for referring Liam Briggs to me for evaluation. Below are my notes for this consultation.  If you have questions, please do not hesitate to call me. I look forward to following your patient along with you.       Sincerely,     Hany Umanzor MD      CC: No Recipients  ______________________________________________________________________________________    Counseling:  The patient was counseled regarding diagnostic results, instructions for management, risk factor reductions, prognosis, patient and family education, impressions, risks and benefits of treatment options and importance of compliance with treatment.      Chief Complaint:  The patient presents today for 2-month followup of chest tightness and syncope s/p stress test and Holter monitor.     History Of Present Illness:    Liam Briggs is a 59 y.o. male patient who presents today for 2-month followup of chest tightness and syncope s/p stress test and Holter monitor. His PMH is significant for hyperlipidemia, DM type 2, GERD and BPH. He presents today to establish cardiovascular care for the evaluation and management of syncope. Holter monitoring performed from 07/02/2024 to 07/09/2024 revealed 2 runs of pSVT. Exercise stress echo performed 07/17/2024 was negative for ischemia. Today, the patient states that he is feeling well with no new cardiac complaints. He denies any recurrent syncope.     Past Surgical History:  He has a past surgical history that includes Other surgical history (02/26/2020).      Social History:  He reports that he has never smoked. He has never used smokeless tobacco. No history on file for alcohol use and drug use.    Family History:  Family History   Problem Relation Name Age of Onset   • Coronary artery disease  "Father          MI 63   • Colon cancer Other Grandmother         Allergies:  Penicillin, Latex, and Peanut    Outpatient Medications:  Current Outpatient Medications   Medication Instructions   • atorvastatin (LIPITOR) 20 mg, oral, Daily   • calcium carbonate (Tums) 200 mg calcium chewable tablet 1 tablet, oral   • lansoprazole (PREVACID) 30 mg, oral, Daily   • sildenafil (VIAGRA) 50 mg, oral        Last Recorded Vitals:  Vitals:    09/12/24 1558   BP: 116/74   BP Location: Left arm   Pulse: 67   SpO2: 96%   Weight: 74.4 kg (164 lb)   Height: 1.765 m (5' 9.5\")         Review of Systems   Cardiovascular:         Occasional chest discomfort   Neurological:         Syncope   All other systems reviewed and are negative.     Physical Exam:  Constitutional:       Appearance: Healthy appearance. Not in distress.   Neck:      Vascular: No JVR. JVD normal.   Pulmonary:      Effort: Pulmonary effort is normal.      Breath sounds: Normal breath sounds. No wheezing. No rhonchi. No rales.   Chest:      Chest wall: Not tender to palpatation.   Cardiovascular:      PMI at left midclavicular line. Normal rate. Regular rhythm. Normal S1. Normal S2.       Murmurs: There is no murmur.      No gallop.  No click. No rub.   Pulses:     Intact distal pulses.   Edema:     Peripheral edema absent.   Abdominal:      General: Bowel sounds are normal.      Palpations: Abdomen is soft.      Tenderness: There is no abdominal tenderness.   Musculoskeletal: Normal range of motion.         General: No tenderness. Skin:     General: Skin is warm and dry.   Neurological:      General: No focal deficit present.      Mental Status: Alert and oriented to person, place and time.            Last Labs:  CBC -  Lab Results   Component Value Date    WBC 6.9 05/26/2024    HGB 15.2 05/26/2024    HCT 45.3 05/26/2024    MCV 90 05/26/2024     05/26/2024       CMP -  Lab Results   Component Value Date    CALCIUM 9.2 05/26/2024    PROT 7.2 05/26/2024    " ALBUMIN 4.5 05/26/2024    AST 30 05/26/2024    ALT 30 05/26/2024    ALKPHOS 53 05/26/2024    BILITOT 1.4 (H) 05/26/2024       LIPID PANEL -   Lab Results   Component Value Date    CHOL 164 09/27/2023    TRIG 118 09/27/2023    HDL 62.1 09/27/2023    CHHDL 2.6 09/27/2023    LDLF 78 09/27/2023    VLDL 24 09/27/2023       RENAL FUNCTION PANEL -   Lab Results   Component Value Date    GLUCOSE 105 (H) 05/26/2024     05/26/2024    K 3.7 05/26/2024     05/26/2024    CO2 26 05/26/2024    ANIONGAP 9 (L) 05/26/2024    BUN 14 05/26/2024    CREATININE 0.94 05/26/2024    GFRMALE 84 09/27/2023    CALCIUM 9.2 05/26/2024    ALBUMIN 4.5 05/26/2024        Lab Results   Component Value Date    BNP 15 05/26/2024    HGBA1C 6.5 (H) 03/27/2024       Last Cardiology Tests:  07/17/2024 - Exercise Stress Echo  1. Adequate level of stress achieved.  2. No clinical, echocardiographic or electrocardiographic evidence for ischemia at maximal workload.    07/02/2024 to 07/09/2024 - Holter Monitor  1. Predominant underlying rhythm was sinus rhythm; min HR 49 bpm, max  bpm, avg HR 79 bpm.  2. 2 supraventricular tachycardia runs occurred; fastest and longest interval lasting 9 beats with max rate 140 bpm.  3. Isolated SVEs were rare, SVE couplets were rare, and no SVE triplets were present.  4. Isolated VEs were rare, VE couplets were rare, and no VE triplets were present.     11/10/2021 - CT Calcium Score  1. Total: 21.1.  2. The visualized mid/lower ascending thoracic aorta, is normal in size, measuring 31 mm in diameter. The heart is normal in size. No pericardial effusion is present.Main pulmonary artery is normal in caliber.  3. There is no evidence of lymphadenopathy or mass within the visualized mediastinum.The visualized esophagus is unremarkable.  4. There is a 4 mm noncalcified subpleural lateral right middle lobe lung nodule on image 28. The caudal to this is a 4 mm nodule on image 35. Caudal to that is a 4 mm nodule on  image 38. There is a 6 mm  noncalcified lateral left lower lobe lung nodule on image 73.   5. There was an 8 mm cyst in segment 7 of the liver on image 99.   6. Otherwise, the visualized subdiaphragmatic structures demonstrate no remarkable findings.    Diagnostic review: I have personally reviewed the result(s) of the Holter Monitor and Exercise Stress Echo.     Assessment/Plan  1) Syncope - Likely Vasovagal in Nature   ED evaluation 05/26/2024 as a rapid response after having a syncopal episode - EKG showed NSR, CXR negative, Urinalysis with 1+ glucose and trace ketones, labs unremarkable.  H/O syncope in childhood  Recent syncopal episode occurred following significant psychosocial stress  Reports occasional chest tightness  Otherwise denies exertional CP or discomfort  Denies SOB either at rest or with exertion  Currently on atorvastatin for management of hyperlipidemia  FH positive for heart disease in father who had a heart attack and PCI x3  Nonsmoker   Suspect this is vasovagal in nature   Holter with 2 runs of pSVT  Exercise stress echo 07/17/2024 negative for ischemia   Denies recurrent syncope   Increase p.o. fluids   Liberalize dietary salt intake   Followup as needed      Scribe Attestation  By signing my name below, I, Diana Vaz   attest that this documentation has been prepared under the direction and in the presence of Hany Umanzor MD.

## 2024-09-12 NOTE — PROGRESS NOTES
"Counseling:  The patient was counseled regarding diagnostic results, instructions for management, risk factor reductions, prognosis, patient and family education, impressions, risks and benefits of treatment options and importance of compliance with treatment.      Chief Complaint:  The patient presents today for 2-month followup of chest tightness and syncope s/p stress test and Holter monitor.     History Of Present Illness:    Liam Briggs is a 59 y.o. male patient who presents today for 2-month followup of chest tightness and syncope s/p stress test and Holter monitor. His PMH is significant for hyperlipidemia, DM type 2, GERD and BPH. He presents today to establish cardiovascular care for the evaluation and management of syncope. Holter monitoring performed from 07/02/2024 to 07/09/2024 revealed 2 runs of pSVT. Exercise stress echo performed 07/17/2024 was negative for ischemia. Today, the patient states that he is feeling well with no new cardiac complaints. He denies any recurrent syncope.     Past Surgical History:  He has a past surgical history that includes Other surgical history (02/26/2020).      Social History:  He reports that he has never smoked. He has never used smokeless tobacco. No history on file for alcohol use and drug use.    Family History:  Family History   Problem Relation Name Age of Onset    Coronary artery disease Father          MI 63    Colon cancer Other Grandmother         Allergies:  Penicillin, Latex, and Peanut    Outpatient Medications:  Current Outpatient Medications   Medication Instructions    atorvastatin (LIPITOR) 20 mg, oral, Daily    calcium carbonate (Tums) 200 mg calcium chewable tablet 1 tablet, oral    lansoprazole (PREVACID) 30 mg, oral, Daily    sildenafil (VIAGRA) 50 mg, oral        Last Recorded Vitals:  Vitals:    09/12/24 1558   BP: 116/74   BP Location: Left arm   Pulse: 67   SpO2: 96%   Weight: 74.4 kg (164 lb)   Height: 1.765 m (5' 9.5\")         Review of " Systems   Cardiovascular:         Occasional chest discomfort   Neurological:         Syncope   All other systems reviewed and are negative.     Physical Exam:  Constitutional:       Appearance: Healthy appearance. Not in distress.   Neck:      Vascular: No JVR. JVD normal.   Pulmonary:      Effort: Pulmonary effort is normal.      Breath sounds: Normal breath sounds. No wheezing. No rhonchi. No rales.   Chest:      Chest wall: Not tender to palpatation.   Cardiovascular:      PMI at left midclavicular line. Normal rate. Regular rhythm. Normal S1. Normal S2.       Murmurs: There is no murmur.      No gallop.  No click. No rub.   Pulses:     Intact distal pulses.   Edema:     Peripheral edema absent.   Abdominal:      General: Bowel sounds are normal.      Palpations: Abdomen is soft.      Tenderness: There is no abdominal tenderness.   Musculoskeletal: Normal range of motion.         General: No tenderness. Skin:     General: Skin is warm and dry.   Neurological:      General: No focal deficit present.      Mental Status: Alert and oriented to person, place and time.            Last Labs:  CBC -  Lab Results   Component Value Date    WBC 6.9 05/26/2024    HGB 15.2 05/26/2024    HCT 45.3 05/26/2024    MCV 90 05/26/2024     05/26/2024       CMP -  Lab Results   Component Value Date    CALCIUM 9.2 05/26/2024    PROT 7.2 05/26/2024    ALBUMIN 4.5 05/26/2024    AST 30 05/26/2024    ALT 30 05/26/2024    ALKPHOS 53 05/26/2024    BILITOT 1.4 (H) 05/26/2024       LIPID PANEL -   Lab Results   Component Value Date    CHOL 164 09/27/2023    TRIG 118 09/27/2023    HDL 62.1 09/27/2023    CHHDL 2.6 09/27/2023    LDLF 78 09/27/2023    VLDL 24 09/27/2023       RENAL FUNCTION PANEL -   Lab Results   Component Value Date    GLUCOSE 105 (H) 05/26/2024     05/26/2024    K 3.7 05/26/2024     05/26/2024    CO2 26 05/26/2024    ANIONGAP 9 (L) 05/26/2024    BUN 14 05/26/2024    CREATININE 0.94 05/26/2024    GFRMALE 84  09/27/2023    CALCIUM 9.2 05/26/2024    ALBUMIN 4.5 05/26/2024        Lab Results   Component Value Date    BNP 15 05/26/2024    HGBA1C 6.5 (H) 03/27/2024       Last Cardiology Tests:  07/17/2024 - Exercise Stress Echo  1. Adequate level of stress achieved.  2. No clinical, echocardiographic or electrocardiographic evidence for ischemia at maximal workload.    07/02/2024 to 07/09/2024 - Holter Monitor  1. Predominant underlying rhythm was sinus rhythm; min HR 49 bpm, max  bpm, avg HR 79 bpm.  2. 2 supraventricular tachycardia runs occurred; fastest and longest interval lasting 9 beats with max rate 140 bpm.  3. Isolated SVEs were rare, SVE couplets were rare, and no SVE triplets were present.  4. Isolated VEs were rare, VE couplets were rare, and no VE triplets were present.     11/10/2021 - CT Calcium Score  1. Total: 21.1.  2. The visualized mid/lower ascending thoracic aorta, is normal in size, measuring 31 mm in diameter. The heart is normal in size. No pericardial effusion is present.Main pulmonary artery is normal in caliber.  3. There is no evidence of lymphadenopathy or mass within the visualized mediastinum.The visualized esophagus is unremarkable.  4. There is a 4 mm noncalcified subpleural lateral right middle lobe lung nodule on image 28. The caudal to this is a 4 mm nodule on image 35. Caudal to that is a 4 mm nodule on image 38. There is a 6 mm  noncalcified lateral left lower lobe lung nodule on image 73.   5. There was an 8 mm cyst in segment 7 of the liver on image 99.   6. Otherwise, the visualized subdiaphragmatic structures demonstrate no remarkable findings.    Diagnostic review: I have personally reviewed the result(s) of the Holter Monitor and Exercise Stress Echo.     Assessment/Plan   1) Syncope - Likely Vasovagal in Nature   ED evaluation 05/26/2024 as a rapid response after having a syncopal episode - EKG showed NSR, CXR negative, Urinalysis with 1+ glucose and trace ketones, labs  unremarkable.  H/O syncope in childhood  Recent syncopal episode occurred following significant psychosocial stress  Reports occasional chest tightness  Otherwise denies exertional CP or discomfort  Denies SOB either at rest or with exertion  Currently on atorvastatin for management of hyperlipidemia  FH positive for heart disease in father who had a heart attack and PCI x3  Nonsmoker   Suspect this is vasovagal in nature   Holter with 2 runs of pSVT  Exercise stress echo 07/17/2024 negative for ischemia   Denies recurrent syncope   Increase p.o. fluids   Liberalize dietary salt intake   Followup as needed      Scribe Attestation  By signing my name below, I, Diana Vaz   attest that this documentation has been prepared under the direction and in the presence of Hany Umanzor MD.

## 2024-10-02 ENCOUNTER — LAB (OUTPATIENT)
Dept: LAB | Facility: LAB | Age: 59
End: 2024-10-02
Payer: COMMERCIAL

## 2024-10-02 DIAGNOSIS — E78.5 HYPERLIPIDEMIA, UNSPECIFIED HYPERLIPIDEMIA TYPE: ICD-10-CM

## 2024-10-02 DIAGNOSIS — E11.9 TYPE 2 DIABETES MELLITUS WITHOUT COMPLICATION, WITHOUT LONG-TERM CURRENT USE OF INSULIN (MULTI): ICD-10-CM

## 2024-10-02 LAB
ALBUMIN SERPL BCP-MCNC: 4.6 G/DL (ref 3.4–5)
ALP SERPL-CCNC: 63 U/L (ref 33–136)
ALT SERPL W P-5'-P-CCNC: 31 U/L (ref 10–52)
ANION GAP SERPL CALC-SCNC: 13 MMOL/L (ref 10–20)
AST SERPL W P-5'-P-CCNC: 25 U/L (ref 9–39)
BILIRUB SERPL-MCNC: 1.1 MG/DL (ref 0–1.2)
BUN SERPL-MCNC: 16 MG/DL (ref 6–23)
CALCIUM SERPL-MCNC: 9.1 MG/DL (ref 8.6–10.3)
CHLORIDE SERPL-SCNC: 101 MMOL/L (ref 98–107)
CHOLEST SERPL-MCNC: 202 MG/DL (ref 0–199)
CHOLESTEROL/HDL RATIO: 2.8
CO2 SERPL-SCNC: 28 MMOL/L (ref 21–32)
CREAT SERPL-MCNC: 0.96 MG/DL (ref 0.5–1.3)
EGFRCR SERPLBLD CKD-EPI 2021: 90 ML/MIN/1.73M*2
EST. AVERAGE GLUCOSE BLD GHB EST-MCNC: 123 MG/DL
GLUCOSE SERPL-MCNC: 89 MG/DL (ref 74–99)
HBA1C MFR BLD: 5.9 %
HDLC SERPL-MCNC: 73.3 MG/DL
LDLC SERPL CALC-MCNC: 109 MG/DL
NON HDL CHOLESTEROL: 129 MG/DL (ref 0–149)
POTASSIUM SERPL-SCNC: 4.3 MMOL/L (ref 3.5–5.3)
PROT SERPL-MCNC: 7.4 G/DL (ref 6.4–8.2)
SODIUM SERPL-SCNC: 138 MMOL/L (ref 136–145)
TRIGL SERPL-MCNC: 98 MG/DL (ref 0–149)
VLDL: 20 MG/DL (ref 0–40)

## 2024-10-02 PROCEDURE — 80061 LIPID PANEL: CPT

## 2024-10-02 PROCEDURE — 80053 COMPREHEN METABOLIC PANEL: CPT

## 2024-10-02 PROCEDURE — 83036 HEMOGLOBIN GLYCOSYLATED A1C: CPT

## 2024-10-02 PROCEDURE — 36415 COLL VENOUS BLD VENIPUNCTURE: CPT

## 2024-10-09 ENCOUNTER — APPOINTMENT (OUTPATIENT)
Dept: PRIMARY CARE | Facility: CLINIC | Age: 59
End: 2024-10-09
Payer: COMMERCIAL

## 2024-10-09 VITALS
TEMPERATURE: 97.6 F | SYSTOLIC BLOOD PRESSURE: 116 MMHG | HEIGHT: 70 IN | WEIGHT: 166 LBS | HEART RATE: 62 BPM | OXYGEN SATURATION: 97 % | DIASTOLIC BLOOD PRESSURE: 70 MMHG | BODY MASS INDEX: 23.77 KG/M2

## 2024-10-09 DIAGNOSIS — R79.89 ELEVATED FERRITIN LEVEL: ICD-10-CM

## 2024-10-09 DIAGNOSIS — K21.9 GASTROESOPHAGEAL REFLUX DISEASE WITHOUT ESOPHAGITIS: ICD-10-CM

## 2024-10-09 DIAGNOSIS — E78.5 HYPERLIPIDEMIA, UNSPECIFIED HYPERLIPIDEMIA TYPE: ICD-10-CM

## 2024-10-09 DIAGNOSIS — L29.9 ITCHING: ICD-10-CM

## 2024-10-09 DIAGNOSIS — E11.9 TYPE 2 DIABETES MELLITUS WITHOUT COMPLICATION, WITHOUT LONG-TERM CURRENT USE OF INSULIN (MULTI): ICD-10-CM

## 2024-10-09 DIAGNOSIS — Z00.00 WELLNESS EXAMINATION: Primary | ICD-10-CM

## 2024-10-09 PROBLEM — N52.9 ERECTILE DYSFUNCTION OF ORGANIC ORIGIN: Status: RESOLVED | Noted: 2023-10-04 | Resolved: 2024-10-09

## 2024-10-09 LAB
POC ALBUMIN /CREATININE RATIO MANUALLY ENTERED: <30 UG/MG CREAT
POC URINE ALBUMIN: 30 MG/L
POC URINE CREATININE: 300 MG/DL

## 2024-10-09 PROCEDURE — 3049F LDL-C 100-129 MG/DL: CPT | Performed by: FAMILY MEDICINE

## 2024-10-09 PROCEDURE — 82044 UR ALBUMIN SEMIQUANTITATIVE: CPT | Performed by: FAMILY MEDICINE

## 2024-10-09 PROCEDURE — 3044F HG A1C LEVEL LT 7.0%: CPT | Performed by: FAMILY MEDICINE

## 2024-10-09 PROCEDURE — 99396 PREV VISIT EST AGE 40-64: CPT | Performed by: FAMILY MEDICINE

## 2024-10-09 PROCEDURE — 3008F BODY MASS INDEX DOCD: CPT | Performed by: FAMILY MEDICINE

## 2024-10-09 PROCEDURE — 99214 OFFICE O/P EST MOD 30 MIN: CPT | Performed by: FAMILY MEDICINE

## 2024-10-09 PROCEDURE — 3078F DIAST BP <80 MM HG: CPT | Performed by: FAMILY MEDICINE

## 2024-10-09 PROCEDURE — 3074F SYST BP LT 130 MM HG: CPT | Performed by: FAMILY MEDICINE

## 2024-10-09 PROCEDURE — 1036F TOBACCO NON-USER: CPT | Performed by: FAMILY MEDICINE

## 2024-10-09 RX ORDER — SILDENAFIL 50 MG/1
50 TABLET, FILM COATED ORAL DAILY PRN
Qty: 10 TABLET | Refills: 5 | Status: CANCELLED | OUTPATIENT
Start: 2024-10-09

## 2024-10-09 RX ORDER — LANSOPRAZOLE 30 MG/1
30 CAPSULE, DELAYED RELEASE ORAL DAILY
Qty: 90 CAPSULE | Refills: 3 | Status: SHIPPED | OUTPATIENT
Start: 2024-10-09

## 2024-10-09 RX ORDER — ATORVASTATIN CALCIUM 20 MG/1
20 TABLET, FILM COATED ORAL DAILY
Qty: 90 TABLET | Refills: 1 | Status: SHIPPED | OUTPATIENT
Start: 2024-10-09

## 2024-10-09 ASSESSMENT — ENCOUNTER SYMPTOMS
VOMITING: 0
DIARRHEA: 0
FATIGUE: 0
APPETITE CHANGE: 0
RHINORRHEA: 0
PALPITATIONS: 0
COUGH: 0
VOICE CHANGE: 0
DYSURIA: 0
CONSTIPATION: 0
DIZZINESS: 0
FEVER: 0
ABDOMINAL PAIN: 0
SORE THROAT: 0
SLEEP DISTURBANCE: 0
SHORTNESS OF BREATH: 0
NAUSEA: 0
CHILLS: 0

## 2024-10-09 ASSESSMENT — PATIENT HEALTH QUESTIONNAIRE - PHQ9
SUM OF ALL RESPONSES TO PHQ9 QUESTIONS 1 AND 2: 0
1. LITTLE INTEREST OR PLEASURE IN DOING THINGS: NOT AT ALL
2. FEELING DOWN, DEPRESSED OR HOPELESS: NOT AT ALL

## 2024-10-09 NOTE — ASSESSMENT & PLAN NOTE
Ferritin levels not drawn with current labs.  Discussed repeat lab draw, but patient would like to wait until next labs due in 6 months.

## 2024-10-09 NOTE — ASSESSMENT & PLAN NOTE
LDL elevated, but patient was holding his cholesterol medicine for 1 week prior to his blood pressure.

## 2024-10-09 NOTE — PROGRESS NOTES
"Subjective   Patient ID: Liam Briggs is a 59 y.o. male who presents for Diabetes, GERD, Hyperlipidemia (Review BW), and Annual Exam.    Liam has been feeling well. No new concerns. He did try holding his cholesterol medication for a few days before his labs to see if his blood sugar improved.     He has been having intermittent itching. Happens on his chest when he eats peanuts. Also in fall tends to be more itchy. No rash. Symptoms come and go.          Review of Systems   Constitutional:  Negative for appetite change, chills, fatigue and fever.   HENT:  Negative for congestion, rhinorrhea, sore throat and voice change.    Eyes:  Negative for visual disturbance.   Respiratory:  Negative for cough and shortness of breath.    Cardiovascular:  Negative for chest pain and palpitations.   Gastrointestinal:  Negative for abdominal pain, constipation, diarrhea, nausea and vomiting.   Genitourinary:  Negative for dysuria.   Skin:  Negative for rash.   Neurological:  Negative for dizziness.   Psychiatric/Behavioral:  Negative for sleep disturbance.        Objective   /70   Pulse 62   Temp 36.4 °C (97.6 °F)   Ht 1.765 m (5' 9.5\")   Wt 75.3 kg (166 lb)   SpO2 97%   BMI 24.16 kg/m²     Physical Exam  Constitutional:       General: He is not in acute distress.     Appearance: Normal appearance.   HENT:      Head: Normocephalic and atraumatic.      Right Ear: Tympanic membrane normal.      Left Ear: Tympanic membrane normal.      Nose: No congestion or rhinorrhea.      Mouth/Throat:      Mouth: Mucous membranes are moist.      Pharynx: No oropharyngeal exudate or posterior oropharyngeal erythema.   Eyes:      Extraocular Movements: Extraocular movements intact.      Conjunctiva/sclera: Conjunctivae normal.   Cardiovascular:      Rate and Rhythm: Normal rate and regular rhythm.      Heart sounds: No murmur heard.  Pulmonary:      Breath sounds: No wheezing or rhonchi.   Abdominal:      General: Bowel sounds are normal. "      Palpations: Abdomen is soft.      Tenderness: There is no abdominal tenderness. There is no guarding or rebound.   Musculoskeletal:         General: No swelling.      Cervical back: Neck supple.   Lymphadenopathy:      Cervical: No cervical adenopathy.   Skin:     General: Skin is warm and dry.   Neurological:      Mental Status: He is alert.      Cranial Nerves: No cranial nerve deficit.      Motor: No weakness.      Coordination: Coordination normal.      Gait: Gait normal.   Psychiatric:         Mood and Affect: Mood normal.         Behavior: Behavior normal.         Assessment/Plan   Problem List Items Addressed This Visit             ICD-10-CM    Gastroesophageal reflux disease (Chronic) K21.9    Relevant Medications    lansoprazole (Prevacid) 30 mg DR capsule    Hyperlipidemia (Chronic) E78.5     LDL elevated, but patient was holding his cholesterol medicine for 1 week prior to his blood pressure.         Relevant Medications    atorvastatin (Lipitor) 20 mg tablet    Other Relevant Orders    Lipid Panel    Type 2 diabetes mellitus without complication, without long-term current use of insulin (Multi) E11.9     Hemoglobin A1c improved to 5.9%.  Continue lifestyle changes.         Relevant Orders    POCT Albumin Random Urine manually resulted (Completed)    Elevated ferritin level R79.89     Ferritin levels not drawn with current labs.  Discussed repeat lab draw, but patient would like to wait until next labs due in 6 months.         Relevant Orders    CBC and Auto Differential    Ferritin    Iron and TIBC     Other Visit Diagnoses         Codes    Wellness examination    -  Primary Z00.00    CPE performed. Screening up-to-date. Patient declined Tdap, flu, COVID and Prevnar-20 vaccines.     Relevant Orders    CBC and Auto Differential    Comprehensive Metabolic Panel    Hemoglobin A1C    Itching     L29.9    Suspect due to xeroderma vs allergies. Discussed using emollient cream. Consider allergy testing.

## 2025-04-03 LAB
ALBUMIN SERPL-MCNC: 4.5 G/DL (ref 3.6–5.1)
ALP SERPL-CCNC: 60 U/L (ref 35–144)
ALT SERPL-CCNC: 24 U/L (ref 9–46)
ANION GAP SERPL CALCULATED.4IONS-SCNC: 10 MMOL/L (CALC) (ref 7–17)
AST SERPL-CCNC: 21 U/L (ref 10–35)
BASOPHILS # BLD AUTO: 60 CELLS/UL (ref 0–200)
BASOPHILS NFR BLD AUTO: 1 %
BILIRUB SERPL-MCNC: 1.3 MG/DL (ref 0.2–1.2)
BUN SERPL-MCNC: 12 MG/DL (ref 7–25)
CALCIUM SERPL-MCNC: 9.5 MG/DL (ref 8.6–10.3)
CHLORIDE SERPL-SCNC: 103 MMOL/L (ref 98–110)
CHOLEST SERPL-MCNC: 147 MG/DL
CHOLEST/HDLC SERPL: 2.2 (CALC)
CO2 SERPL-SCNC: 26 MMOL/L (ref 20–32)
CREAT SERPL-MCNC: 0.95 MG/DL (ref 0.7–1.35)
EGFRCR SERPLBLD CKD-EPI 2021: 91 ML/MIN/1.73M2
EOSINOPHIL # BLD AUTO: 210 CELLS/UL (ref 15–500)
EOSINOPHIL NFR BLD AUTO: 3.5 %
ERYTHROCYTE [DISTWIDTH] IN BLOOD BY AUTOMATED COUNT: 12.2 % (ref 11–15)
EST. AVERAGE GLUCOSE BLD GHB EST-MCNC: 123 MG/DL
EST. AVERAGE GLUCOSE BLD GHB EST-SCNC: 6.8 MMOL/L
FERRITIN SERPL-MCNC: 325 NG/ML (ref 24–380)
GLUCOSE SERPL-MCNC: 106 MG/DL (ref 65–99)
HBA1C MFR BLD: 5.9 % OF TOTAL HGB
HCT VFR BLD AUTO: 44.6 % (ref 38.5–50)
HDLC SERPL-MCNC: 66 MG/DL
HGB BLD-MCNC: 15.1 G/DL (ref 13.2–17.1)
IRON SATN MFR SERPL: 30 % (CALC) (ref 20–48)
IRON SERPL-MCNC: 88 MCG/DL (ref 50–180)
LDLC SERPL CALC-MCNC: 63 MG/DL (CALC)
LYMPHOCYTES # BLD AUTO: 2022 CELLS/UL (ref 850–3900)
LYMPHOCYTES NFR BLD AUTO: 33.7 %
MCH RBC QN AUTO: 30.1 PG (ref 27–33)
MCHC RBC AUTO-ENTMCNC: 33.9 G/DL (ref 32–36)
MCV RBC AUTO: 89 FL (ref 80–100)
MONOCYTES # BLD AUTO: 666 CELLS/UL (ref 200–950)
MONOCYTES NFR BLD AUTO: 11.1 %
NEUTROPHILS # BLD AUTO: 3042 CELLS/UL (ref 1500–7800)
NEUTROPHILS NFR BLD AUTO: 50.7 %
NONHDLC SERPL-MCNC: 81 MG/DL (CALC)
PLATELET # BLD AUTO: 262 THOUSAND/UL (ref 140–400)
PMV BLD REES-ECKER: 10 FL (ref 7.5–12.5)
POTASSIUM SERPL-SCNC: 4.5 MMOL/L (ref 3.5–5.3)
PROT SERPL-MCNC: 7.2 G/DL (ref 6.1–8.1)
RBC # BLD AUTO: 5.01 MILLION/UL (ref 4.2–5.8)
SODIUM SERPL-SCNC: 139 MMOL/L (ref 135–146)
TIBC SERPL-MCNC: 298 MCG/DL (CALC) (ref 250–425)
TRIGL SERPL-MCNC: 93 MG/DL
WBC # BLD AUTO: 6 THOUSAND/UL (ref 3.8–10.8)

## 2025-04-09 ENCOUNTER — APPOINTMENT (OUTPATIENT)
Dept: PRIMARY CARE | Facility: CLINIC | Age: 60
End: 2025-04-09
Payer: COMMERCIAL

## 2025-04-09 VITALS
DIASTOLIC BLOOD PRESSURE: 82 MMHG | BODY MASS INDEX: 23.73 KG/M2 | OXYGEN SATURATION: 98 % | WEIGHT: 163 LBS | TEMPERATURE: 97.3 F | SYSTOLIC BLOOD PRESSURE: 130 MMHG | HEART RATE: 76 BPM

## 2025-04-09 DIAGNOSIS — E11.9 TYPE 2 DIABETES MELLITUS WITHOUT COMPLICATION, WITHOUT LONG-TERM CURRENT USE OF INSULIN: ICD-10-CM

## 2025-04-09 DIAGNOSIS — Z00.00 WELLNESS EXAMINATION: Primary | ICD-10-CM

## 2025-04-09 DIAGNOSIS — S91.209A AVULSION OF TOENAIL OF LEFT FOOT: ICD-10-CM

## 2025-04-09 DIAGNOSIS — R79.89 ELEVATED FERRITIN LEVEL: ICD-10-CM

## 2025-04-09 DIAGNOSIS — Z12.5 SCREENING FOR PROSTATE CANCER: ICD-10-CM

## 2025-04-09 DIAGNOSIS — E78.5 HYPERLIPIDEMIA, UNSPECIFIED HYPERLIPIDEMIA TYPE: Chronic | ICD-10-CM

## 2025-04-09 DIAGNOSIS — K21.9 GASTROESOPHAGEAL REFLUX DISEASE WITHOUT ESOPHAGITIS: ICD-10-CM

## 2025-04-09 PROCEDURE — 3075F SYST BP GE 130 - 139MM HG: CPT | Performed by: FAMILY MEDICINE

## 2025-04-09 PROCEDURE — 99214 OFFICE O/P EST MOD 30 MIN: CPT | Performed by: FAMILY MEDICINE

## 2025-04-09 PROCEDURE — 3079F DIAST BP 80-89 MM HG: CPT | Performed by: FAMILY MEDICINE

## 2025-04-09 PROCEDURE — 99396 PREV VISIT EST AGE 40-64: CPT | Performed by: FAMILY MEDICINE

## 2025-04-09 PROCEDURE — 1036F TOBACCO NON-USER: CPT | Performed by: FAMILY MEDICINE

## 2025-04-09 RX ORDER — LANSOPRAZOLE 30 MG/1
30 CAPSULE, DELAYED RELEASE ORAL DAILY
Qty: 90 CAPSULE | Refills: 1 | Status: SHIPPED | OUTPATIENT
Start: 2025-04-09

## 2025-04-09 RX ORDER — ATORVASTATIN CALCIUM 20 MG/1
20 TABLET, FILM COATED ORAL DAILY
Qty: 90 TABLET | Refills: 1 | Status: SHIPPED | OUTPATIENT
Start: 2025-04-09

## 2025-04-09 ASSESSMENT — ENCOUNTER SYMPTOMS
DIZZINESS: 0
WEAKNESS: 0
NAUSEA: 0
APPETITE CHANGE: 0
PALPITATIONS: 0
SORE THROAT: 0
CHILLS: 0
FEVER: 0
VOICE CHANGE: 0
DYSURIA: 0
SHORTNESS OF BREATH: 0
VOMITING: 0
RHINORRHEA: 0
COUGH: 0
DIARRHEA: 0
FATIGUE: 0
CONSTIPATION: 0
ABDOMINAL PAIN: 0

## 2025-04-09 ASSESSMENT — PATIENT HEALTH QUESTIONNAIRE - PHQ9
2. FEELING DOWN, DEPRESSED OR HOPELESS: NOT AT ALL
1. LITTLE INTEREST OR PLEASURE IN DOING THINGS: NOT AT ALL
SUM OF ALL RESPONSES TO PHQ9 QUESTIONS 1 AND 2: 0

## 2025-04-09 NOTE — PROGRESS NOTES
Subjective   Patient ID: Liam Briggs is a 60 y.o. male who presents for Diabetes, GERD, and Hyperlipidemia (Review BW).    Liam has been feeling well. No new concerns. Has been working on his diet to improve his cholesterol. Eating more beans and high fiber foods.     He did smash his left great toenail with a metal shan at work. Toenail is very slow to grow out. Old bruised nail has not grown out or fallen off yet.            Review of Systems   Constitutional:  Negative for appetite change, chills, fatigue and fever.   HENT:  Negative for congestion, rhinorrhea, sore throat and voice change.    Respiratory:  Negative for cough and shortness of breath.    Cardiovascular:  Negative for chest pain and palpitations.   Gastrointestinal:  Negative for abdominal pain, constipation, diarrhea, nausea and vomiting.   Genitourinary:  Negative for dysuria.   Neurological:  Negative for dizziness, syncope and weakness.       Objective   /82   Pulse 76   Temp 36.3 °C (97.3 °F)   Wt 73.9 kg (163 lb)   SpO2 98%   BMI 23.73 kg/m²     Physical Exam  Constitutional:       General: He is not in acute distress.     Appearance: Normal appearance.   HENT:      Head: Normocephalic and atraumatic.      Right Ear: Tympanic membrane normal.      Left Ear: Tympanic membrane normal.      Nose: No congestion or rhinorrhea.      Mouth/Throat:      Mouth: Mucous membranes are moist.      Pharynx: No oropharyngeal exudate or posterior oropharyngeal erythema.   Eyes:      Extraocular Movements: Extraocular movements intact.      Conjunctiva/sclera: Conjunctivae normal.   Cardiovascular:      Rate and Rhythm: Normal rate and regular rhythm.      Heart sounds: No murmur heard.  Pulmonary:      Breath sounds: No wheezing or rhonchi.   Abdominal:      General: Bowel sounds are normal.      Palpations: Abdomen is soft.      Tenderness: There is no abdominal tenderness. There is no guarding or rebound.   Musculoskeletal:         General: No  swelling.      Cervical back: Neck supple.   Feet:      Comments: Left great toenail with blackened area and new nail growth at base.   Lymphadenopathy:      Cervical: No cervical adenopathy.   Skin:     General: Skin is warm and dry.   Neurological:      Mental Status: He is alert.      Cranial Nerves: No cranial nerve deficit.      Motor: No weakness.      Coordination: Coordination normal.      Gait: Gait normal.   Psychiatric:         Mood and Affect: Mood normal.         Behavior: Behavior normal.         Assessment/Plan   Problem List Items Addressed This Visit             ICD-10-CM    Gastroesophageal reflux disease (Chronic) K21.9     Continue lansoprazole.         Relevant Medications    lansoprazole (Prevacid) 30 mg DR capsule    Hyperlipidemia (Chronic) E78.5     LDL improved significantly with diet changes.         Relevant Medications    atorvastatin (Lipitor) 20 mg tablet    Other Relevant Orders    Lipid Panel    Type 2 diabetes mellitus without complication, without long-term current use of insulin E11.9     Hemoglobin A1c stable at 5.9%.  Continue lifestyle changes.         Relevant Orders    Comprehensive Metabolic Panel    Hemoglobin A1C    Albumin-Creatinine Ratio, Urine Random    Elevated ferritin level R79.89     Elevated ferritin level resolved on recent blood work.         Relevant Orders    Ferritin    Iron and TIBC     Other Visit Diagnoses         Codes    Wellness examination    -  Primary Z00.00    CPE performed. Recommend Tdap, pneumonia vaccines. Due for diabetes eye exam.     Avulsion of toenail of left foot     S91.209A    Discussed may take 6-12 months for new nail to grow out.     Screening for prostate cancer     Z12.5    Relevant Orders    Prostate Specific Antigen

## 2025-06-24 DIAGNOSIS — E78.5 HYPERLIPIDEMIA, UNSPECIFIED HYPERLIPIDEMIA TYPE: Chronic | ICD-10-CM

## 2025-06-24 DIAGNOSIS — E11.9 TYPE 2 DIABETES MELLITUS WITHOUT COMPLICATION, WITHOUT LONG-TERM CURRENT USE OF INSULIN: ICD-10-CM

## 2025-06-24 DIAGNOSIS — R79.89 ELEVATED FERRITIN LEVEL: ICD-10-CM

## 2025-10-22 ENCOUNTER — APPOINTMENT (OUTPATIENT)
Dept: PRIMARY CARE | Facility: CLINIC | Age: 60
End: 2025-10-22
Payer: COMMERCIAL

## (undated) DEVICE — 6 X 9  1.75MIL 4-WALL LABGUARD: Brand: MINIGRIP COMMERCIAL LLC

## (undated) DEVICE — KENDALL 450 SERIES MONITORING FOAM ELECTRODE - RECTANGULAR SHAPE ( 3/PK): Brand: KENDALL

## (undated) DEVICE — VALVE SUCTION AIR H2O HYDR H2O JET CONN STRL ORCA POD + DISP

## (undated) DEVICE — BLOCK BITE 60FR CAREGUARD

## (undated) DEVICE — GOWN ISOLATN REG YEL M WT MULTIPLY SIDETIE LEV 2

## (undated) DEVICE — LUBRICANT SURG JELLY ST BACTER TUBE 4.25OZ

## (undated) DEVICE — MEDI-VAC YANKAUER SUCTION HANDLE W/BULBOUS TIP: Brand: CARDINAL HEALTH

## (undated) DEVICE — MEDI-VAC NON-CONDUCTIVE SUCTION TUBING: Brand: CARDINAL HEALTH

## (undated) DEVICE — CONTAINER SPEC COLL 960ML POLYPR TRIANG GRAD INTAKE/OUTPUT

## (undated) DEVICE — SPONGE GZ 4IN 4IN 4 PLY N WVN AVANT

## (undated) DEVICE — KIT BEDSIDE REVITAL OX 500ML

## (undated) DEVICE — FORCEPS BX L240CM JAW DIA2.8MM L CAP W/ NDL MIC MESH TOOTH

## (undated) DEVICE — MASK,FACE,MAXFLUIDPROTECT,SHIELD/ERLPS: Brand: MEDLINE